# Patient Record
Sex: MALE | Race: WHITE | NOT HISPANIC OR LATINO
[De-identification: names, ages, dates, MRNs, and addresses within clinical notes are randomized per-mention and may not be internally consistent; named-entity substitution may affect disease eponyms.]

---

## 2018-04-09 ENCOUNTER — APPOINTMENT (OUTPATIENT)
Dept: ORTHOPEDIC SURGERY | Facility: CLINIC | Age: 43
End: 2018-04-09
Payer: COMMERCIAL

## 2018-04-09 VITALS — WEIGHT: 210 LBS | BODY MASS INDEX: 29.4 KG/M2 | HEIGHT: 71 IN

## 2018-04-09 DIAGNOSIS — Z87.891 PERSONAL HISTORY OF NICOTINE DEPENDENCE: ICD-10-CM

## 2018-04-09 DIAGNOSIS — J44.9 CHRONIC OBSTRUCTIVE PULMONARY DISEASE, UNSPECIFIED: ICD-10-CM

## 2018-04-09 DIAGNOSIS — M25.579 PAIN IN UNSPECIFIED ANKLE AND JOINTS OF UNSPECIFIED FOOT: ICD-10-CM

## 2018-04-09 DIAGNOSIS — Z78.9 OTHER SPECIFIED HEALTH STATUS: ICD-10-CM

## 2018-04-09 PROCEDURE — 73610 X-RAY EXAM OF ANKLE: CPT | Mod: LT

## 2018-04-09 PROCEDURE — 99203 OFFICE O/P NEW LOW 30 MIN: CPT | Mod: 25

## 2018-04-09 PROCEDURE — 29515 APPLICATION SHORT LEG SPLINT: CPT | Mod: LT

## 2018-04-12 ENCOUNTER — APPOINTMENT (OUTPATIENT)
Dept: ORTHOPEDIC SURGERY | Facility: HOSPITAL | Age: 43
End: 2018-04-12
Payer: COMMERCIAL

## 2018-04-12 DIAGNOSIS — S82.842A DISPLACED BIMALLEOLAR FRACTURE OF LEFT LOWER LEG, INITIAL ENCOUNTER FOR CLOSED FRACTURE: ICD-10-CM

## 2018-04-12 PROCEDURE — 27814 TREATMENT OF ANKLE FRACTURE: CPT | Mod: LT

## 2018-04-23 ENCOUNTER — APPOINTMENT (OUTPATIENT)
Dept: ORTHOPEDIC SURGERY | Facility: CLINIC | Age: 43
End: 2018-04-23
Payer: COMMERCIAL

## 2018-04-23 PROCEDURE — 99024 POSTOP FOLLOW-UP VISIT: CPT

## 2018-04-23 PROCEDURE — 73610 X-RAY EXAM OF ANKLE: CPT | Mod: LT

## 2018-04-23 RX ORDER — HYDROCODONE BITARTRATE AND ACETAMINOPHEN 5; 325 MG/1; MG/1
5-325 TABLET ORAL
Qty: 30 | Refills: 0 | Status: DISCONTINUED | COMMUNITY
Start: 2018-04-12 | End: 2018-04-23

## 2018-05-21 ENCOUNTER — APPOINTMENT (OUTPATIENT)
Dept: ORTHOPEDIC SURGERY | Facility: CLINIC | Age: 43
End: 2018-05-21
Payer: COMMERCIAL

## 2018-05-21 PROCEDURE — 73610 X-RAY EXAM OF ANKLE: CPT | Mod: LT

## 2018-05-21 PROCEDURE — 99024 POSTOP FOLLOW-UP VISIT: CPT

## 2018-06-08 ENCOUNTER — APPOINTMENT (OUTPATIENT)
Dept: ORTHOPEDIC SURGERY | Facility: CLINIC | Age: 43
End: 2018-06-08
Payer: COMMERCIAL

## 2018-06-08 PROCEDURE — 99024 POSTOP FOLLOW-UP VISIT: CPT

## 2018-06-08 PROCEDURE — 73600 X-RAY EXAM OF ANKLE: CPT | Mod: LT

## 2018-06-09 ENCOUNTER — TRANSCRIPTION ENCOUNTER (OUTPATIENT)
Age: 43
End: 2018-06-09

## 2018-07-02 ENCOUNTER — APPOINTMENT (OUTPATIENT)
Dept: ORTHOPEDIC SURGERY | Facility: CLINIC | Age: 43
End: 2018-07-02
Payer: COMMERCIAL

## 2018-07-02 PROCEDURE — 99024 POSTOP FOLLOW-UP VISIT: CPT

## 2018-07-02 PROCEDURE — 73600 X-RAY EXAM OF ANKLE: CPT | Mod: LT

## 2018-10-01 ENCOUNTER — APPOINTMENT (OUTPATIENT)
Dept: ORTHOPEDIC SURGERY | Facility: CLINIC | Age: 43
End: 2018-10-01
Payer: COMMERCIAL

## 2018-10-01 DIAGNOSIS — S82.842D DISPLACED BIMALLEOLAR FRACTURE OF LEFT LOWER LEG, SUBSEQUENT ENCOUNTER FOR CLOSED FRACTURE WITH ROUTINE HEALING: ICD-10-CM

## 2018-10-01 PROCEDURE — 99213 OFFICE O/P EST LOW 20 MIN: CPT

## 2018-10-01 PROCEDURE — 73600 X-RAY EXAM OF ANKLE: CPT | Mod: LT

## 2018-11-13 ENCOUNTER — RECORD ABSTRACTING (OUTPATIENT)
Age: 43
End: 2018-11-13

## 2018-11-13 DIAGNOSIS — E83.52 HYPERCALCEMIA: ICD-10-CM

## 2018-11-13 DIAGNOSIS — Z80.0 FAMILY HISTORY OF MALIGNANT NEOPLASM OF DIGESTIVE ORGANS: ICD-10-CM

## 2018-11-13 DIAGNOSIS — F10.21 ALCOHOL DEPENDENCE, IN REMISSION: ICD-10-CM

## 2018-11-13 DIAGNOSIS — Z80.42 FAMILY HISTORY OF MALIGNANT NEOPLASM OF PROSTATE: ICD-10-CM

## 2018-12-07 ENCOUNTER — APPOINTMENT (OUTPATIENT)
Dept: UROLOGY | Facility: CLINIC | Age: 43
End: 2018-12-07
Payer: COMMERCIAL

## 2018-12-07 VITALS
SYSTOLIC BLOOD PRESSURE: 127 MMHG | BODY MASS INDEX: 29.4 KG/M2 | DIASTOLIC BLOOD PRESSURE: 86 MMHG | HEIGHT: 71 IN | WEIGHT: 210 LBS | HEART RATE: 108 BPM

## 2018-12-07 DIAGNOSIS — N40.0 BENIGN PROSTATIC HYPERPLASIA WITHOUT LOWER URINARY TRACT SYMPMS: ICD-10-CM

## 2018-12-07 DIAGNOSIS — N52.1 ERECTILE DYSFUNCTION DUE TO DISEASES CLASSIFIED ELSEWHERE: ICD-10-CM

## 2018-12-07 DIAGNOSIS — R19.5 OTHER FECAL ABNORMALITIES: ICD-10-CM

## 2018-12-07 PROCEDURE — 99215 OFFICE O/P EST HI 40 MIN: CPT | Mod: 25

## 2018-12-07 PROCEDURE — 82270 OCCULT BLOOD FECES: CPT

## 2018-12-07 PROCEDURE — 81000 URINALYSIS NONAUTO W/SCOPE: CPT

## 2018-12-11 PROBLEM — N52.1 ERECTILE DYSFUNCTION DUE TO DISEASES CLASSIFIED ELSEWHERE: Status: ACTIVE | Noted: 2018-11-13

## 2018-12-11 PROBLEM — R19.5 GUAIAC POSITIVE STOOLS: Status: ACTIVE | Noted: 2018-12-11

## 2018-12-11 PROBLEM — N40.0 BENIGN PROSTATIC HYPERPLASIA WITHOUT LOWER URINARY TRACT SYMPTOMS: Status: ACTIVE | Noted: 2018-11-13

## 2018-12-11 LAB
BACTERIA: 0
BILIRUB UR QL STRIP: NORMAL
CASTS: 0
CLARITY UR: CLEAR
COLLECTION METHOD: NORMAL
CRYSTALS: 0
DATE COLLECTED: NORMAL
EPITHELIAL CELLS: 0
GLUCOSE UR-MCNC: NORMAL
HCG UR QL: 2 EU/DL
HEMOCCULT SP1 STL QL: NEGATIVE
HGB UR QL STRIP.AUTO: NORMAL
KETONES UR-MCNC: NORMAL
LEUKOCYTE ESTERASE UR QL STRIP: NORMAL
MUCUS: 0
NITRITE UR QL STRIP: NORMAL
PH UR STRIP: 5
PROT UR STRIP-MCNC: NORMAL
QUALITY CONTROL: YES
RBC CASTS # UR COMP ASSIST: 0
SP GR UR STRIP: 1.02
WBC: 0

## 2018-12-11 RX ORDER — SILDENAFIL CITRATE 25 MG/1
25 TABLET, FILM COATED ORAL
Refills: 0 | Status: DISCONTINUED | COMMUNITY
End: 2018-12-11

## 2018-12-27 ENCOUNTER — TRANSCRIPTION ENCOUNTER (OUTPATIENT)
Age: 43
End: 2018-12-27

## 2018-12-31 ENCOUNTER — APPOINTMENT (OUTPATIENT)
Dept: INTERNAL MEDICINE | Facility: CLINIC | Age: 43
End: 2018-12-31
Payer: COMMERCIAL

## 2018-12-31 VITALS
SYSTOLIC BLOOD PRESSURE: 124 MMHG | TEMPERATURE: 98.8 F | BODY MASS INDEX: 29.4 KG/M2 | DIASTOLIC BLOOD PRESSURE: 88 MMHG | WEIGHT: 210 LBS | OXYGEN SATURATION: 98 % | HEIGHT: 71 IN | HEART RATE: 112 BPM

## 2018-12-31 DIAGNOSIS — S91.309A UNSPECIFIED OPEN WOUND, UNSPECIFIED FOOT, INITIAL ENCOUNTER: ICD-10-CM

## 2018-12-31 PROCEDURE — 99213 OFFICE O/P EST LOW 20 MIN: CPT

## 2019-01-02 PROBLEM — S91.309A WOUND, OPEN, FOOT: Status: ACTIVE | Noted: 2019-01-02

## 2019-01-02 NOTE — ASSESSMENT
[FreeTextEntry1] : patient will need to be seen in wound center. Calls were place and patient will be evaluated. There is no pain or necrotic tissue as of yet. Area was cleaned and bandaged. In my opinion patient is a candidate for hyperbarics.

## 2019-01-02 NOTE — PHYSICAL EXAM
[de-identified] : there is a large second degree open wound on the surface of the right foot. There is yellow discharge. Skin layer is gone. There is granulation tissue forming on the side.

## 2019-01-02 NOTE — HISTORY OF PRESENT ILLNESS
[FreeTextEntry8] : Patient last week dropped boiling water on his foot. He went to urgent care twice. He was given a shot of antibiotic a elliotanus shot and sent home with oral antibiotics. Patient wound has still not healed.

## 2019-10-28 ENCOUNTER — APPOINTMENT (OUTPATIENT)
Dept: INTERNAL MEDICINE | Facility: CLINIC | Age: 44
End: 2019-10-28
Payer: COMMERCIAL

## 2019-10-28 ENCOUNTER — NON-APPOINTMENT (OUTPATIENT)
Age: 44
End: 2019-10-28

## 2019-10-28 VITALS
OXYGEN SATURATION: 97 % | BODY MASS INDEX: 33.04 KG/M2 | HEART RATE: 98 BPM | WEIGHT: 236 LBS | DIASTOLIC BLOOD PRESSURE: 82 MMHG | TEMPERATURE: 98.6 F | SYSTOLIC BLOOD PRESSURE: 130 MMHG | HEIGHT: 71 IN

## 2019-10-28 DIAGNOSIS — Z00.00 ENCOUNTER FOR GENERAL ADULT MEDICAL EXAMINATION W/OUT ABNORMAL FINDINGS: ICD-10-CM

## 2019-10-28 PROCEDURE — G0442 ANNUAL ALCOHOL SCREEN 15 MIN: CPT

## 2019-10-28 PROCEDURE — 36415 COLL VENOUS BLD VENIPUNCTURE: CPT

## 2019-10-28 PROCEDURE — 99396 PREV VISIT EST AGE 40-64: CPT | Mod: 25

## 2019-10-28 PROCEDURE — 93000 ELECTROCARDIOGRAM COMPLETE: CPT

## 2019-10-28 PROCEDURE — G0444 DEPRESSION SCREEN ANNUAL: CPT

## 2019-10-28 RX ORDER — FLUTICASONE PROPIONATE 50 UG/1
50 SPRAY, METERED NASAL
Refills: 0 | Status: DISCONTINUED | COMMUNITY
End: 2019-10-28

## 2019-10-28 RX ORDER — SILDENAFIL 20 MG/1
20 TABLET ORAL
Refills: 0 | Status: DISCONTINUED | COMMUNITY
End: 2019-10-28

## 2019-10-28 NOTE — PHYSICAL EXAM
[No Acute Distress] : no acute distress [PERRL] : pupils equal round and reactive to light [Normal Outer Ear/Nose] : the outer ears and nose were normal in appearance [Normal TMs] : both tympanic membranes were normal [No Lymphadenopathy] : no lymphadenopathy [Supple] : supple [No Respiratory Distress] : no respiratory distress  [No Accessory Muscle Use] : no accessory muscle use [Clear to Auscultation] : lungs were clear to auscultation bilaterally [Normal Rate] : normal rate  [Regular Rhythm] : with a regular rhythm [Normal S1, S2] : normal S1 and S2 [No Murmur] : no murmur heard [No Carotid Bruits] : no carotid bruits [Pedal Pulses Present] : the pedal pulses are present [No Edema] : there was no peripheral edema [Soft] : abdomen soft [Non Tender] : non-tender [Normal Bowel Sounds] : normal bowel sounds [Declined Rectal Exam] : declined rectal exam [No CVA Tenderness] : no CVA  tenderness [No Spinal Tenderness] : no spinal tenderness [Normal Supraclavicular Nodes] : no supraclavicular lymphadenopathy [Normal Posterior Cervical Nodes] : no posterior cervical lymphadenopathy [Normal Anterior Cervical Nodes] : no anterior cervical lymphadenopathy [No Joint Swelling] : no joint swelling [Grossly Normal Strength/Tone] : grossly normal strength/tone [Normal] : affect was normal and insight and judgment were intact [de-identified] : wears glasses [de-identified] : obese; umbilical hernia [de-identified] : healed surgical scar left ankle [de-identified] : 4 cm lesion right shoulder

## 2019-10-28 NOTE — REVIEW OF SYSTEMS
[Poor Libido] : poor libido [Fever] : no fever [Chills] : no chills [Fatigue] : no fatigue [Pain] : no pain [Redness] : no redness [Nosebleeds] : no nosebleeds [Postnasal Drip] : no postnasal drip [Hoarseness] : no hoarseness [Chest Pain] : no chest pain [Palpitations] : no palpitations [Lower Ext Edema] : no lower extremity edema [Shortness Of Breath] : no shortness of breath [Cough] : no cough [Abdominal Pain] : no abdominal pain [Nausea] : no nausea [Constipation] : no constipation [Diarrhea] : no diarrhea [Melena] : no melena [Dysuria] : no dysuria [Incontinence] : no incontinence [Nocturia] : no nocturia [Impotence] : no impotency [Joint Stiffness] : no joint stiffness [Itching] : no itching [Headache] : no headache [Dizziness] : no dizziness [Suicidal] : not suicidal [Insomnia] : no insomnia [Depression] : no depression [Easy Bleeding] : no easy bleeding [Easy Bruising] : no easy bruising [Swollen Glands] : no swollen glands [FreeTextEntry3] : wears glasses for distant; last eye exam more than 1 year ago [FreeTextEntry9] : s/p left ankle surgery [de-identified] : he has an inflamed epidermoid cyst on left shoulder [FreeTextEntry1] : Endocrine: no increased thirst, polydipsia, polyphagia, polydipsia

## 2019-10-28 NOTE — HISTORY OF PRESENT ILLNESS
[FreeTextEntry1] : Annual Physical [de-identified] : 44 year old man, h/o Sleep Apnea, HTN, Erectile Dysfunction here for his Annual Exam.  He is requesting a referral to GI; his father was diagnosed with Colon Cancer in his early 60's.

## 2019-10-28 NOTE — HEALTH RISK ASSESSMENT
[Very Good] : ~his/her~ current health as very good [Good] : ~his/her~  mood as  good [No falls in past year] : Patient reported no falls in the past year [0] : 2) Feeling down, depressed, or hopeless: Not at all (0) [HIV test declined] : HIV test declined [Hepatitis C test declined] : Hepatitis C test declined [None] : None [Alone] : lives alone [Employed] : employed [College] : College [Single] : single [# Of Children ___] : has [unfilled] children [Sexually Active] : sexually active [Feels Safe at Home] : Feels safe at home [Fully functional (bathing, dressing, toileting, transferring, walking, feeding)] : Fully functional (bathing, dressing, toileting, transferring, walking, feeding) [Fully functional (using the telephone, shopping, preparing meals, housekeeping, doing laundry, using] : Fully functional and needs no help or supervision to perform IADLs (using the telephone, shopping, preparing meals, housekeeping, doing laundry, using transportation, managing medications and managing finances) [Smoke Detector] : smoke detector [Carbon Monoxide Detector] : carbon monoxide detector [6-10] : 6-10 [Yes] : Yes [2 - 3 times a week (3 pts)] : 2 - 3  times a week (3 points) [3 or 4 (1 pt)] : 3 or 4  (1 point) [Never (0 pts)] : Never (0 points) [No] : In the past 12 months have you used drugs other than those required for medical reasons? No [Seat Belt] :  uses seat belt [With Patient/Caregiver] : With Patient/Caregiver [] : No [YearQuit] : 2015 [Audit-CScore] : 4 [de-identified] : no [de-identified] : yes [[id number]] : 0 [Change in mental status noted] : No change in mental status noted [Reports changes in hearing] : Reports no changes in hearing [Reports changes in vision] : Reports no changes in vision [Reports normal functional visual acuity (ie: able to read med bottle)] : Reports poor functional visual acuity.  [Reports changes in dental health] : Reports no changes in dental health [Guns at Home] : no guns at home [ColonoscopyComments] : never perform [FreeTextEntry2] :  [AdvancecareDate] : 10/19

## 2019-11-14 LAB
ALBUMIN SERPL ELPH-MCNC: 4.5 G/DL
ALP BLD-CCNC: 82 U/L
ALT SERPL-CCNC: 57 U/L
ANION GAP SERPL CALC-SCNC: 12 MMOL/L
APPEARANCE: CLEAR
AST SERPL-CCNC: 31 U/L
BACTERIA: NEGATIVE
BASOPHILS # BLD AUTO: 0.08 K/UL
BASOPHILS NFR BLD AUTO: 1.3 %
BILIRUB SERPL-MCNC: 0.2 MG/DL
BILIRUBIN URINE: NEGATIVE
BLOOD URINE: NEGATIVE
BUN SERPL-MCNC: 12 MG/DL
CALCIUM SERPL-MCNC: 9.4 MG/DL
CHLORIDE SERPL-SCNC: 105 MMOL/L
CHOLEST SERPL-MCNC: 187 MG/DL
CHOLEST/HDLC SERPL: 4.5 RATIO
CO2 SERPL-SCNC: 23 MMOL/L
COLOR: YELLOW
CREAT SERPL-MCNC: 0.73 MG/DL
EOSINOPHIL # BLD AUTO: 0.25 K/UL
EOSINOPHIL NFR BLD AUTO: 4.1 %
ESTIMATED AVERAGE GLUCOSE: 114 MG/DL
GLUCOSE QUALITATIVE U: NEGATIVE
GLUCOSE SERPL-MCNC: 127 MG/DL
HBA1C MFR BLD HPLC: 5.6 %
HCT VFR BLD CALC: 46.5 %
HDLC SERPL-MCNC: 42 MG/DL
HGB BLD-MCNC: 14.8 G/DL
HYALINE CASTS: 0 /LPF
IMM GRANULOCYTES NFR BLD AUTO: 0.2 %
KETONES URINE: NEGATIVE
LDLC SERPL CALC-MCNC: 126 MG/DL
LEUKOCYTE ESTERASE URINE: NEGATIVE
LYMPHOCYTES # BLD AUTO: 1.56 K/UL
LYMPHOCYTES NFR BLD AUTO: 25.9 %
MAN DIFF?: NORMAL
MCHC RBC-ENTMCNC: 31.8 GM/DL
MCHC RBC-ENTMCNC: 33.9 PG
MCV RBC AUTO: 106.4 FL
MICROSCOPIC-UA: NORMAL
MONOCYTES # BLD AUTO: 0.52 K/UL
MONOCYTES NFR BLD AUTO: 8.6 %
NEUTROPHILS # BLD AUTO: 3.61 K/UL
NEUTROPHILS NFR BLD AUTO: 59.9 %
NITRITE URINE: NEGATIVE
PH URINE: 5.5
PLATELET # BLD AUTO: 205 K/UL
POTASSIUM SERPL-SCNC: 4.9 MMOL/L
PROT SERPL-MCNC: 6.8 G/DL
PROTEIN URINE: NEGATIVE
PSA FREE FLD-MCNC: 37 %
PSA FREE SERPL-MCNC: 0.09 NG/ML
PSA SERPL-MCNC: 0.24 NG/ML
RBC # BLD: 4.37 M/UL
RBC # FLD: 12.2 %
RED BLOOD CELLS URINE: 1 /HPF
SODIUM SERPL-SCNC: 140 MMOL/L
SPECIFIC GRAVITY URINE: 1.02
SQUAMOUS EPITHELIAL CELLS: 0 /HPF
TRIGL SERPL-MCNC: 97 MG/DL
UROBILINOGEN URINE: NORMAL
WBC # FLD AUTO: 6.03 K/UL
WHITE BLOOD CELLS URINE: 1 /HPF

## 2020-03-12 ENCOUNTER — APPOINTMENT (OUTPATIENT)
Dept: GASTROENTEROLOGY | Facility: CLINIC | Age: 45
End: 2020-03-12
Payer: COMMERCIAL

## 2020-03-12 VITALS
OXYGEN SATURATION: 97 % | DIASTOLIC BLOOD PRESSURE: 80 MMHG | HEART RATE: 103 BPM | SYSTOLIC BLOOD PRESSURE: 132 MMHG | BODY MASS INDEX: 33.04 KG/M2 | WEIGHT: 236 LBS | HEIGHT: 71 IN

## 2020-03-12 DIAGNOSIS — Z12.11 ENCOUNTER FOR SCREENING FOR MALIGNANT NEOPLASM OF COLON: ICD-10-CM

## 2020-03-12 PROCEDURE — 99204 OFFICE O/P NEW MOD 45 MIN: CPT

## 2020-03-12 NOTE — ASSESSMENT
[FreeTextEntry1] : Will plan on a colonoscopy for screening.  Explained risks/benefits/alternatives including not limited to bleeding, infection, perforation, missed lesions, anesthesia complications.  Patient understands and agrees, all questions answered.  Will use a split dose miralax/gatorade prep with clears the day prior.\par \par Thank you for referring Mr. HENRIQUEZ.  Please do not hesitate to call to further discuss his/her care.\par \par Note faxed to requesting MD.\par \par

## 2020-03-12 NOTE — HISTORY OF PRESENT ILLNESS
[FreeTextEntry1] : Mr. Benítez is a pleasant 44M h/o obesity, HTN, ANDRES who is seen at the request of Dr. Flores and Dr. Lopez for evaluation for colon cancer screening.  His father had colon cancer in his early 60's, no other family relatives with colon cancer or other GI malignancies.\par \par He has never had any form of colon cancer screening.  Has a normal brown BM daily, although he has complained of narrow stool.  No blood, black stool, diarrhea, constipation.\par \par Has had mild heartburn brought on by specific food triggers which he avoids, otherwise no heartburn or regurgitation.\par \par Previous smoker, quit 2015.  Has multiple alcoholic drinks weekly.

## 2021-02-17 ENCOUNTER — APPOINTMENT (OUTPATIENT)
Dept: PULMONOLOGY | Facility: CLINIC | Age: 46
End: 2021-02-17
Payer: MEDICAID

## 2021-02-17 VITALS
OXYGEN SATURATION: 95 % | SYSTOLIC BLOOD PRESSURE: 146 MMHG | DIASTOLIC BLOOD PRESSURE: 80 MMHG | WEIGHT: 242 LBS | BODY MASS INDEX: 33.88 KG/M2 | HEIGHT: 71 IN | TEMPERATURE: 98.1 F | HEART RATE: 98 BPM

## 2021-02-17 PROCEDURE — 99213 OFFICE O/P EST LOW 20 MIN: CPT

## 2021-02-17 PROCEDURE — 99072 ADDL SUPL MATRL&STAF TM PHE: CPT

## 2021-02-18 NOTE — HISTORY OF PRESENT ILLNESS
[FreeTextEntry1] : 44 y/o man is here for a follow-up for his ANDRES. He has been compliant with his APAP machine on a nightly basis. He reports some nerve tingling in his L arm which he thinks might be from leaning on it most of the day. He is sleeping less because he is back on a schedule that has him waking up earlier. He does occasionally fall asleep watching TV downstairs then goes up to bed later. He is currently using a medium Dreamwear nasal mask. His machine is from [employer]. He was given a Dreamwear FullFace Medium mask and headgear in the office. He has gained some weight since his last visit in 2017. He denies lower extremity edema, SOB, dry mouth, cough, or congestion. He does not use nasal saline.

## 2021-02-18 NOTE — PROCEDURE
[FreeTextEntry1] : APAP Compliance, Date: 01/18/2021 - 02/16/2021\par Usage: 30 days (100.0% Compliance)\par Percent of Days with Usage >= 4 hours: 90% \par Average Use: 5 hours 21 minutes\par Pressure Setting 4-15 cm H2O \par Auto-CPAP Peak Avg Pressure: 11.7 cmH2O\par Avg Device Pressure <= 90% of the time: 12.0 cmH2O\par Average Time in Large Leak per day: 1 min 20 secs\par Avg AHI: 2.9\par \par PSG, Date: 03/06/2018, AHI: [30.2], [REM AHI: [0]], lowest saturation [85]%], PLM Index: [9.5]\par \par HST [10/24/2017], AHI: [66.3], lowest saturation [63]%

## 2021-03-09 ENCOUNTER — TRANSCRIPTION ENCOUNTER (OUTPATIENT)
Age: 46
End: 2021-03-09

## 2021-03-10 ENCOUNTER — APPOINTMENT (OUTPATIENT)
Dept: INTERNAL MEDICINE | Facility: CLINIC | Age: 46
End: 2021-03-10
Payer: MEDICAID

## 2021-03-10 ENCOUNTER — LABORATORY RESULT (OUTPATIENT)
Age: 46
End: 2021-03-10

## 2021-03-10 VITALS
OXYGEN SATURATION: 97 % | HEART RATE: 86 BPM | HEIGHT: 71 IN | BODY MASS INDEX: 34.16 KG/M2 | DIASTOLIC BLOOD PRESSURE: 98 MMHG | SYSTOLIC BLOOD PRESSURE: 144 MMHG | TEMPERATURE: 96.7 F | RESPIRATION RATE: 16 BRPM | WEIGHT: 244 LBS

## 2021-03-10 DIAGNOSIS — U07.1 COVID-19: ICD-10-CM

## 2021-03-10 PROCEDURE — G0442 ANNUAL ALCOHOL SCREEN 15 MIN: CPT

## 2021-03-10 PROCEDURE — 99396 PREV VISIT EST AGE 40-64: CPT | Mod: 25

## 2021-03-10 PROCEDURE — 99072 ADDL SUPL MATRL&STAF TM PHE: CPT

## 2021-03-10 PROCEDURE — G0447 BEHAVIOR COUNSEL OBESITY 15M: CPT

## 2021-03-10 PROCEDURE — G0444 DEPRESSION SCREEN ANNUAL: CPT

## 2021-03-10 RX ORDER — TADALAFIL 20 MG/1
20 TABLET ORAL
Qty: 50 | Refills: 0 | Status: ACTIVE | COMMUNITY
Start: 2021-03-10 | End: 1900-01-01

## 2021-03-11 NOTE — HISTORY OF PRESENT ILLNESS
Last OV 9/27/2018. Next OV 2/26/2019. [FreeTextEntry1] : annual exam  [de-identified] : Patient is here for an annual exam , patient currently feels fine, denies chest pain or abdominal pain. Reports being non compliant with diet due to stress. Patient has high BP but will return in two week for recheck of blood pressure.

## 2021-03-11 NOTE — HEALTH RISK ASSESSMENT
[Very Good] : ~his/her~  mood as very good [Yes] : Yes [Monthly or less (1 pt)] : Monthly or less (1 point) [3 or 4 (1 pt)] : 3 or 4  (1 point) [Less than monthly (1 pt)] : Less than monthly (1 point) [No] : In the past 12 months have you used drugs other than those required for medical reasons? No [No falls in past year] : Patient reported no falls in the past year [1] : 2) Feeling down, depressed, or hopeless for several days (1) [Alone] : lives alone [With Family] : lives with family [Employed] : employed [Single] : single [Fully functional (bathing, dressing, toileting, transferring, walking, feeding)] : Fully functional (bathing, dressing, toileting, transferring, walking, feeding) [Fully functional (using the telephone, shopping, preparing meals, housekeeping, doing laundry, using] : Fully functional and needs no help or supervision to perform IADLs (using the telephone, shopping, preparing meals, housekeeping, doing laundry, using transportation, managing medications and managing finances) [Smoke Detector] : smoke detector [Carbon Monoxide Detector] : carbon monoxide detector [] : No [Audit-CScore] : 3 [[id number]] : 2 [Change in mental status noted] : No change in mental status noted [Reports changes in hearing] : Reports no changes in hearing [Reports changes in vision] : Reports no changes in vision [Reports changes in dental health] : Reports no changes in dental health [Guns at Home] : no guns at home

## 2021-03-16 LAB
ALBUMIN SERPL ELPH-MCNC: 4.7 G/DL
ALP BLD-CCNC: 81 U/L
ALT SERPL-CCNC: 56 U/L
ANION GAP SERPL CALC-SCNC: 11 MMOL/L
AST SERPL-CCNC: 42 U/L
BASOPHILS # BLD AUTO: 0.12 K/UL
BASOPHILS NFR BLD AUTO: 1.7 %
BILIRUB SERPL-MCNC: 0.3 MG/DL
BUN SERPL-MCNC: 14 MG/DL
CALCIUM SERPL-MCNC: 9.4 MG/DL
CHLORIDE SERPL-SCNC: 102 MMOL/L
CHOLEST SERPL-MCNC: 212 MG/DL
CO2 SERPL-SCNC: 24 MMOL/L
CREAT SERPL-MCNC: 0.74 MG/DL
EOSINOPHIL # BLD AUTO: 0.31 K/UL
EOSINOPHIL NFR BLD AUTO: 4.3 %
ESTIMATED AVERAGE GLUCOSE: 128 MG/DL
GLUCOSE SERPL-MCNC: 125 MG/DL
HBA1C MFR BLD HPLC: 6.1 %
HCT VFR BLD CALC: 50.1 %
HDLC SERPL-MCNC: 47 MG/DL
HGB BLD-MCNC: 16.1 G/DL
LDLC SERPL CALC-MCNC: 136 MG/DL
LYMPHOCYTES # BLD AUTO: 1.61 K/UL
LYMPHOCYTES NFR BLD AUTO: 22.2 %
MAN DIFF?: NORMAL
MCHC RBC-ENTMCNC: 32.1 GM/DL
MCHC RBC-ENTMCNC: 33.3 PG
MCV RBC AUTO: 103.5 FL
MONOCYTES # BLD AUTO: 0.5 K/UL
MONOCYTES NFR BLD AUTO: 6.9 %
NEUTROPHILS # BLD AUTO: 4.33 K/UL
NEUTROPHILS NFR BLD AUTO: 58.1 %
NONHDLC SERPL-MCNC: 164 MG/DL
PLATELET # BLD AUTO: 209 K/UL
POTASSIUM SERPL-SCNC: 4.9 MMOL/L
PROT SERPL-MCNC: 7.3 G/DL
PSA SERPL-MCNC: 0.29 NG/ML
RBC # BLD: 4.84 M/UL
RBC # FLD: 12.7 %
SARS-COV-2 IGG SERPL IA-ACNC: 0.06 INDEX
SARS-COV-2 IGG SERPL QL IA: NEGATIVE
SODIUM SERPL-SCNC: 138 MMOL/L
T4 SERPL-MCNC: 6.3 UG/DL
TRIGL SERPL-MCNC: 140 MG/DL
TSH SERPL-ACNC: 0.96 UIU/ML
VIT B12 SERPL-MCNC: 628 PG/ML
WBC # FLD AUTO: 7.24 K/UL

## 2021-03-25 ENCOUNTER — APPOINTMENT (OUTPATIENT)
Dept: INTERNAL MEDICINE | Facility: CLINIC | Age: 46
End: 2021-03-25
Payer: MEDICAID

## 2021-03-25 VITALS
WEIGHT: 244 LBS | BODY MASS INDEX: 34.16 KG/M2 | DIASTOLIC BLOOD PRESSURE: 90 MMHG | HEART RATE: 110 BPM | HEIGHT: 71 IN | SYSTOLIC BLOOD PRESSURE: 150 MMHG | OXYGEN SATURATION: 98 %

## 2021-03-25 DIAGNOSIS — I10 ESSENTIAL (PRIMARY) HYPERTENSION: ICD-10-CM

## 2021-03-25 PROCEDURE — 86580 TB INTRADERMAL TEST: CPT

## 2021-03-25 PROCEDURE — 99072 ADDL SUPL MATRL&STAF TM PHE: CPT

## 2021-03-25 PROCEDURE — 99213 OFFICE O/P EST LOW 20 MIN: CPT | Mod: 25

## 2021-03-29 PROBLEM — I10 ESSENTIAL (PRIMARY) HYPERTENSION: Status: ACTIVE | Noted: 2018-11-13

## 2021-05-19 ENCOUNTER — APPOINTMENT (OUTPATIENT)
Dept: PULMONOLOGY | Facility: CLINIC | Age: 46
End: 2021-05-19

## 2021-06-11 ENCOUNTER — APPOINTMENT (OUTPATIENT)
Dept: PULMONOLOGY | Facility: CLINIC | Age: 46
End: 2021-06-11
Payer: MEDICAID

## 2021-06-11 VITALS
DIASTOLIC BLOOD PRESSURE: 82 MMHG | BODY MASS INDEX: 35.28 KG/M2 | WEIGHT: 252 LBS | TEMPERATURE: 98.1 F | OXYGEN SATURATION: 97 % | SYSTOLIC BLOOD PRESSURE: 134 MMHG | HEIGHT: 71 IN | HEART RATE: 96 BPM

## 2021-06-11 LAB — EPWORTH SCORE: 4

## 2021-06-11 PROCEDURE — 99214 OFFICE O/P EST MOD 30 MIN: CPT

## 2021-06-13 NOTE — PROCEDURE
[FreeTextEntry1] : APAP Compliance, Date: 05/12/2021 - 06/10/2021\par Usage: 29 days (96.7% Compliance)\par Percent of Days with Usage >= 4 hours: 93.3% \par Average Use: 7 hours 11 minutes\par Pressure Setting 6-15 cm H2O \par Auto-CPAP Peak Avg Pressure: 13.2 cmH2O\par Avg Device Pressure <= 90% of the time: 13.4 cmH2O\par Average Time in Large Leak per day: 1 min 46 secs\par Avg AHI: 1.6\par \par APAP Compliance, Date: 01/18/2021 - 02/16/2021\par Usage: 30 days (100.0% Compliance)\par Percent of Days with Usage >= 4 hours: 90% \par Average Use: 5 hours 21 minutes\par Pressure Setting 4-15 cm H2O \par Auto-CPAP Peak Avg Pressure: 11.7 cmH2O\par Avg Device Pressure <= 90% of the time: 12.0 cmH2O\par Average Time in Large Leak per day: 1 min 20 secs\par Avg AHI: 2.9\par \par PSG, Date: 03/06/2018, AHI: [30.2], [REM AHI: [0]], lowest saturation [85]%], PLM Index: [9.5]\par \par HST [10/24/2017], AHI: [66.3], lowest saturation [63]%

## 2021-06-13 NOTE — PHYSICAL EXAM
[No Acute Distress] : no acute distress [Normal Appearance] : normal appearance [No Neck Mass] : no neck mass [Normal Rate/Rhythm] : normal rate/rhythm [Normal S1, S2] : normal s1, s2 [No Murmurs] : no murmurs [No Resp Distress] : no resp distress [Clear to Auscultation Bilaterally] : clear to auscultation bilaterally [No Abnormalities] : no abnormalities [Benign] : benign [Normal Gait] : normal gait [No Clubbing] : no clubbing [No Cyanosis] : no cyanosis [FROM] : FROM [Normal Color/ Pigmentation] : normal color/ pigmentation [No Focal Deficits] : no focal deficits [Oriented x3] : oriented x3 [Normal Affect] : normal affect [IV] : Mallampati Class: IV [TextBox_2] : obese [TextBox_11] : nasal turbinate hypertrophy, dry mucosa, crowded oropharynx [TextBox_89] : obese [TextBox_105] : trace edema

## 2021-06-13 NOTE — HISTORY OF PRESENT ILLNESS
[Former] : former [Never] : never [TextBox_4] : Patient is here on a f/u for ANDRES. Patient has been compliant with the APAP. He feels slightly more awake and feels better during the day. However he says sometimes he is waking up in the middle of the night and is wide awake. He says he has gotten a lot better on remembering to use the APAP before going to sleep. Last night, he felt himself getting tired and went straight to bed but could not fall asleep. He c/o sometimes waking up with a dry mouth. He denies leg swelling. He reports his weight has been up by 6lbs but his BP is down.

## 2021-09-08 ENCOUNTER — APPOINTMENT (OUTPATIENT)
Dept: INTERNAL MEDICINE | Facility: CLINIC | Age: 46
End: 2021-09-08

## 2021-09-09 ENCOUNTER — APPOINTMENT (OUTPATIENT)
Dept: INTERNAL MEDICINE | Facility: CLINIC | Age: 46
End: 2021-09-09
Payer: MEDICAID

## 2021-09-09 VITALS
HEART RATE: 76 BPM | DIASTOLIC BLOOD PRESSURE: 100 MMHG | BODY MASS INDEX: 35.84 KG/M2 | HEIGHT: 71 IN | SYSTOLIC BLOOD PRESSURE: 150 MMHG | OXYGEN SATURATION: 98 % | WEIGHT: 256 LBS

## 2021-09-09 PROCEDURE — 99214 OFFICE O/P EST MOD 30 MIN: CPT | Mod: 25

## 2021-09-17 ENCOUNTER — APPOINTMENT (OUTPATIENT)
Dept: PULMONOLOGY | Facility: CLINIC | Age: 46
End: 2021-09-17
Payer: MEDICAID

## 2021-09-17 VITALS
WEIGHT: 257 LBS | BODY MASS INDEX: 35.98 KG/M2 | DIASTOLIC BLOOD PRESSURE: 78 MMHG | OXYGEN SATURATION: 99 % | HEIGHT: 71 IN | HEART RATE: 114 BPM | SYSTOLIC BLOOD PRESSURE: 126 MMHG | TEMPERATURE: 98.6 F

## 2021-09-17 LAB
ALBUMIN SERPL ELPH-MCNC: 4.8 G/DL
ALP BLD-CCNC: 83 U/L
ALT SERPL-CCNC: 80 U/L
ANION GAP SERPL CALC-SCNC: 13 MMOL/L
AST SERPL-CCNC: 44 U/L
BILIRUB SERPL-MCNC: 0.4 MG/DL
BUN SERPL-MCNC: 16 MG/DL
CALCIUM SERPL-MCNC: 9.5 MG/DL
CHLORIDE SERPL-SCNC: 102 MMOL/L
CHOLEST SERPL-MCNC: 212 MG/DL
CO2 SERPL-SCNC: 23 MMOL/L
CREAT SERPL-MCNC: 0.77 MG/DL
EPWORTH SCORE: 3
ESTIMATED AVERAGE GLUCOSE: 131 MG/DL
GLUCOSE SERPL-MCNC: 150 MG/DL
HAV IGM SER QL: NONREACTIVE
HBA1C MFR BLD HPLC: 6.2 %
HBV CORE IGM SER QL: NONREACTIVE
HBV SURFACE AG SER QL: NONREACTIVE
HCV AB SER QL: NONREACTIVE
HCV S/CO RATIO: 0.14 S/CO
HDLC SERPL-MCNC: 45 MG/DL
LDLC SERPL CALC-MCNC: 129 MG/DL
NONHDLC SERPL-MCNC: 167 MG/DL
POTASSIUM SERPL-SCNC: 4.9 MMOL/L
PROT SERPL-MCNC: 7.3 G/DL
SODIUM SERPL-SCNC: 138 MMOL/L
TRIGL SERPL-MCNC: 189 MG/DL

## 2021-09-17 PROCEDURE — 99214 OFFICE O/P EST MOD 30 MIN: CPT

## 2021-09-23 NOTE — HISTORY OF PRESENT ILLNESS
[Former] : former [Never] : never [TextBox_4] : Patient returns on a f/u for ANDRES. He has been fully compliant with CPAP although his machine has been subject to the recall. He feels better in general but is still having significant trouble getting a full night of sleep. He will get sleep for the first couple of hours but is then "wide awake" for 2-3 hours in the middle of the night. When he does fall asleep again he can usually sleep for another 2-5 hours until he has to wake up. He states he is not overly tired during the day and feels the urge to nap maybe 2 days a week. On the days he can sleep in later he feels better during the day. His breathing has been fine. He c/o rare chest tightness which he attributes more to work stress than anything else. Dr. Flores put him on Lexapro recently but he is getting "almost every side effect from it"(dry mouth, lip smacking, slight headache all week, jitters). He also believes the Lexapro is part of the reason he is waking up at night, as he has had multiple short nights this week following starting on it. He notes he woke up again last night but interestingly got better than usual sleep when he fell back to sleep eventually. He has been using nasal saline on a regular basis, almost every night when he remembers. He is 257 lbs today and has started trying to eat healthier.

## 2021-09-23 NOTE — PHYSICAL EXAM
[No Acute Distress] : no acute distress [IV] : Mallampati Class: IV [Normal Appearance] : normal appearance [No Neck Mass] : no neck mass [Normal Rate/Rhythm] : normal rate/rhythm [Normal S1, S2] : normal s1, s2 [No Murmurs] : no murmurs [No Resp Distress] : no resp distress [Clear to Auscultation Bilaterally] : clear to auscultation bilaterally [No Abnormalities] : no abnormalities [Benign] : benign [Normal Gait] : normal gait [No Clubbing] : no clubbing [No Cyanosis] : no cyanosis [FROM] : FROM [Normal Color/ Pigmentation] : normal color/ pigmentation [Oriented x3] : oriented x3 [Normal Affect] : normal affect [Erythema] : erythema [Turbinate hypertrophy] : turbinate hypertrophy [No Edema] : no edema [TextBox_2] : obese [TextBox_11] : frequent lip smacking, dry MM, crowded o/p  [TextBox_89] : obese [TextBox_132] : fine tremor R>L UE

## 2021-11-17 ENCOUNTER — APPOINTMENT (OUTPATIENT)
Dept: INTERNAL MEDICINE | Facility: CLINIC | Age: 46
End: 2021-11-17
Payer: MEDICAID

## 2021-11-17 VITALS
SYSTOLIC BLOOD PRESSURE: 128 MMHG | RESPIRATION RATE: 16 BRPM | WEIGHT: 258 LBS | BODY MASS INDEX: 36.12 KG/M2 | HEART RATE: 87 BPM | DIASTOLIC BLOOD PRESSURE: 86 MMHG | HEIGHT: 71 IN | OXYGEN SATURATION: 97 %

## 2021-11-17 DIAGNOSIS — F41.9 ANXIETY DISORDER, UNSPECIFIED: ICD-10-CM

## 2021-11-17 DIAGNOSIS — F32.A ANXIETY DISORDER, UNSPECIFIED: ICD-10-CM

## 2021-11-17 PROCEDURE — 99214 OFFICE O/P EST MOD 30 MIN: CPT

## 2021-11-18 PROBLEM — F41.9 ANXIETY AND DEPRESSION: Status: ACTIVE | Noted: 2021-09-09

## 2021-11-30 DIAGNOSIS — F90.9 ATTENTION-DEFICIT HYPERACTIVITY DISORDER, UNSPECIFIED TYPE: ICD-10-CM

## 2021-12-07 ENCOUNTER — NON-APPOINTMENT (OUTPATIENT)
Age: 46
End: 2021-12-07

## 2021-12-13 ENCOUNTER — APPOINTMENT (OUTPATIENT)
Dept: INTERNAL MEDICINE | Facility: CLINIC | Age: 46
End: 2021-12-13

## 2022-01-03 ENCOUNTER — APPOINTMENT (OUTPATIENT)
Dept: PULMONOLOGY | Facility: CLINIC | Age: 47
End: 2022-01-03
Payer: MEDICAID

## 2022-01-03 VITALS
DIASTOLIC BLOOD PRESSURE: 76 MMHG | BODY MASS INDEX: 36.96 KG/M2 | TEMPERATURE: 98.81 F | HEIGHT: 71 IN | OXYGEN SATURATION: 97 % | WEIGHT: 264 LBS | SYSTOLIC BLOOD PRESSURE: 134 MMHG | HEART RATE: 95 BPM

## 2022-01-03 PROCEDURE — 99214 OFFICE O/P EST MOD 30 MIN: CPT

## 2022-01-05 NOTE — PROCEDURE
[FreeTextEntry1] : APAP Compliance, Date: 12/04/2021 - 01/02/2022\par Usage: 30 days (100.0% Compliance)\par Percent of Days with Usage >= 4 hours: 96.7% \par Average Use: 7 hours 48 minutes\par Pressure Setting 8-20 cm H2O \par Auto-CPAP Peak Avg Pressure: 13.1 cmH2O\par Avg Device Pressure <= 90% of the time: 13.7 cmH2O\par Average Time in Large Leak per day: 1 min 32 secs\par Avg AHI: 1.2\par \par APAP Compliance, Date: 08/12/2021 - 09/10/2021\par Usage: 28 days (93.3% Compliance)\par Percent of Days with Usage >= 4 hours: 90% \par Average Use: 6 hours 39 minutes 27 seconds\par Pressure Setting 6-15 cm H2O \par Auto-CPAP Peak Avg Pressure: 13.5 cmH2O\par Avg Device Pressure <= 90% of the time: 13.7 cmH2O\par Average Time in Large Leak per day: 1 min 19 secs\par Avg AHI: 1.9\par \par APAP Compliance, Date: 05/12/2021 - 06/10/2021\par Usage: 29 days (96.7% Compliance)\par Percent of Days with Usage >= 4 hours: 93.3% \par Average Use: 7 hours 11 minutes\par Pressure Setting 6-15 cm H2O \par Auto-CPAP Peak Avg Pressure: 13.2 cmH2O\par Avg Device Pressure <= 90% of the time: 13.4 cmH2O\par Average Time in Large Leak per day: 1 min 46 secs\par Avg AHI: 1.6\par \par APAP Compliance, Date: 01/18/2021 - 02/16/2021\par Usage: 30 days (100.0% Compliance)\par Percent of Days with Usage >= 4 hours: 90% \par Average Use: 5 hours 21 minutes\par Pressure Setting 4-15 cm H2O \par Auto-CPAP Peak Avg Pressure: 11.7 cmH2O\par Avg Device Pressure <= 90% of the time: 12.0 cmH2O\par Average Time in Large Leak per day: 1 min 20 secs\par Avg AHI: 2.9\par \par PSG, Date: 03/06/2018, AHI: [30.2], [REM AHI: [0]], lowest saturation [85]%], PLM Index: [9.5]\par \par HST [10/24/2017], AHI: [66.3], lowest saturation [63]%

## 2022-01-05 NOTE — PHYSICAL EXAM
[No Acute Distress] : no acute distress [IV] : Mallampati Class: IV [Normal Appearance] : normal appearance [No Neck Mass] : no neck mass [Normal Rate/Rhythm] : normal rate/rhythm [Normal S1, S2] : normal s1, s2 [No Murmurs] : no murmurs [No Resp Distress] : no resp distress [Clear to Auscultation Bilaterally] : clear to auscultation bilaterally [No Abnormalities] : no abnormalities [Benign] : benign [Normal Gait] : normal gait [No Clubbing] : no clubbing [No Cyanosis] : no cyanosis [No Edema] : no edema [FROM] : FROM [Normal Color/ Pigmentation] : normal color/ pigmentation [Oriented x3] : oriented x3 [Normal Affect] : normal affect [Nasal congestion] : nasal congestion [TextBox_2] : obese [TextBox_11] : dry nasal mucosa [TextBox_89] : obese [TextBox_132] : fine tremor R>L UE

## 2022-01-05 NOTE — HISTORY OF PRESENT ILLNESS
[Former] : former [Never] : never [TextBox_4] : Patient returns on a f/u for ANDRES. He has been fully compliant w/ CPAP but is still waiting on a replacement machine. He is still not sleeping great and specifically is having trouble staying asleep. He can get around 2-4 hours of sleep until he finds himself lying in bed "wide awake." Dr. Flores switched him off Lexapro and put him on 5 mg Adderall instead, which seems to be effective in helping his mood w/o the side effects the Lexapro was causing. He has only been using nasal saline at night. He has gained 7 lbs since his last visit in mid September.

## 2022-01-11 ENCOUNTER — TRANSCRIPTION ENCOUNTER (OUTPATIENT)
Age: 47
End: 2022-01-11

## 2022-01-21 ENCOUNTER — RESULT REVIEW (OUTPATIENT)
Age: 47
End: 2022-01-21

## 2022-03-07 ENCOUNTER — LABORATORY RESULT (OUTPATIENT)
Age: 47
End: 2022-03-07

## 2022-03-07 ENCOUNTER — APPOINTMENT (OUTPATIENT)
Dept: PULMONOLOGY | Facility: CLINIC | Age: 47
End: 2022-03-07
Payer: MEDICAID

## 2022-03-07 VITALS
TEMPERATURE: 97.3 F | HEIGHT: 71 IN | BODY MASS INDEX: 36.12 KG/M2 | WEIGHT: 258 LBS | OXYGEN SATURATION: 97 % | DIASTOLIC BLOOD PRESSURE: 60 MMHG | HEART RATE: 95 BPM | SYSTOLIC BLOOD PRESSURE: 148 MMHG

## 2022-03-07 PROCEDURE — 99214 OFFICE O/P EST MOD 30 MIN: CPT

## 2022-03-07 NOTE — PROCEDURE
[FreeTextEntry1] : APAP Compliance, Date: 02/05/2022 - 03/06/2022\par Usage: 30 days (100.0% Compliance)\par Percent of Days with Usage >= 4 hours: 100% \par Average Use: 8 hours 18 minutes\par Pressure Setting 10-20 cm H2O \par Auto-CPAP Peak Avg Pressure: 15.8 cmH2O\par Avg Device Pressure <= 90% of the time: 14.8 cmH2O\par Average Time in Large Leak per day: 1 min 50 secs\par Avg AHI: 0.8\par \par APAP Compliance, Date: 12/04/2021 - 01/02/2022\par Usage: 30 days (100.0% Compliance)\par Percent of Days with Usage >= 4 hours: 96.7% \par Average Use: 7 hours 48 minutes\par Pressure Setting 8-20 cm H2O \par Auto-CPAP Peak Avg Pressure: 13.1 cmH2O\par Avg Device Pressure <= 90% of the time: 13.7 cmH2O\par Average Time in Large Leak per day: 1 min 32 secs\par Avg AHI: 1.2\par \par APAP Compliance, Date: 08/12/2021 - 09/10/2021\par Usage: 28 days (93.3% Compliance)\par Percent of Days with Usage >= 4 hours: 90% \par Average Use: 6 hours 39 minutes 27 seconds\par Pressure Setting 6-15 cm H2O \par Auto-CPAP Peak Avg Pressure: 13.5 cmH2O\par Avg Device Pressure <= 90% of the time: 13.7 cmH2O\par Average Time in Large Leak per day: 1 min 19 secs\par Avg AHI: 1.9\par \par APAP Compliance, Date: 05/12/2021 - 06/10/2021\par Usage: 29 days (96.7% Compliance)\par Percent of Days with Usage >= 4 hours: 93.3% \par Average Use: 7 hours 11 minutes\par Pressure Setting 6-15 cm H2O \par Auto-CPAP Peak Avg Pressure: 13.2 cmH2O\par Avg Device Pressure <= 90% of the time: 13.4 cmH2O\par Average Time in Large Leak per day: 1 min 46 secs\par Avg AHI: 1.6\par \par APAP Compliance, Date: 01/18/2021 - 02/16/2021\par Usage: 30 days (100.0% Compliance)\par Percent of Days with Usage >= 4 hours: 90% \par Average Use: 5 hours 21 minutes\par Pressure Setting 4-15 cm H2O \par Auto-CPAP Peak Avg Pressure: 11.7 cmH2O\par Avg Device Pressure <= 90% of the time: 12.0 cmH2O\par Average Time in Large Leak per day: 1 min 20 secs\par Avg AHI: 2.9\par \par PSG, Date: 03/06/2018, AHI: [30.2], [REM AHI: [0]], lowest saturation [85]%], PLM Index: [9.5]\par \par HST [10/24/2017], AHI: [66.3], lowest saturation [63]%

## 2022-03-07 NOTE — HISTORY OF PRESENT ILLNESS
[Former] : former [Never] : never [TextBox_4] : Patient returns on a f/u for ANDRES. It's been almost 2 months now since he's had COVID (tested + 1/11) but he still feels residual tiredness. He has been fully compliant w/ his CPAP but is still waiting for his new [employer]station. He is sleeping a lot more but still wakes up in the middle of the night. He notes he never regularly slept through the night his entire life, and although it improved the last 4-5 years w/ CPAP he still wakes up. + C/o dry throat while sitting in the exam room today and has been coughing consistently. He rarely gets any nasal issues unless he is tired/ yawning a lot. He uses nasal saline at least 2 sprays 3-4x/ day. Patient denies seasonal allergies, CP, pressure, leg pain/ swelling or muscle aches. He had gained weight previously but is down 6 lbs since January (264 -> 258).

## 2022-03-07 NOTE — REASON FOR VISIT
[Follow-Up] : a follow-up visit [Sleep Apnea] : sleep apnea [COPD] : COPD [TextBox_44] : COVID IN JAN 22

## 2022-03-07 NOTE — PHYSICAL EXAM
[No Acute Distress] : no acute distress [IV] : Mallampati Class: IV [Normal Appearance] : normal appearance [No Neck Mass] : no neck mass [Normal Rate/Rhythm] : normal rate/rhythm [Normal S1, S2] : normal s1, s2 [No Murmurs] : no murmurs [No Resp Distress] : no resp distress [Clear to Auscultation Bilaterally] : clear to auscultation bilaterally [No Abnormalities] : no abnormalities [Benign] : benign [Normal Gait] : normal gait [No Clubbing] : no clubbing [No Cyanosis] : no cyanosis [No Edema] : no edema [FROM] : FROM [Normal Color/ Pigmentation] : normal color/ pigmentation [Oriented x3] : oriented x3 [Normal Affect] : normal affect [Erythema] : erythema [Turbinate hypertrophy] : turbinate hypertrophy [No Focal Deficits] : no focal deficits [TextBox_2] : obese [TextBox_11] : erythematous o/p [TextBox_54] : distant [TextBox_89] : obese

## 2022-03-08 LAB
ALBUMIN SERPL ELPH-MCNC: 4.5 G/DL
ALP BLD-CCNC: 87 U/L
ALT SERPL-CCNC: 71 U/L
ANION GAP SERPL CALC-SCNC: 12 MMOL/L
AST SERPL-CCNC: 41 U/L
BASOPHILS # BLD AUTO: 0.03 K/UL
BASOPHILS NFR BLD AUTO: 0.4 %
BILIRUB SERPL-MCNC: 0.2 MG/DL
BUN SERPL-MCNC: 11 MG/DL
CALCIUM SERPL-MCNC: 9.5 MG/DL
CHLORIDE SERPL-SCNC: 102 MMOL/L
CO2 SERPL-SCNC: 24 MMOL/L
CREAT SERPL-MCNC: 0.69 MG/DL
CRP SERPL-MCNC: 14 MG/L
EGFR: 114 ML/MIN/1.73M2
EOSINOPHIL # BLD AUTO: 0.41 K/UL
EOSINOPHIL NFR BLD AUTO: 5.6 %
ERYTHROCYTE [SEDIMENTATION RATE] IN BLOOD BY WESTERGREN METHOD: 21 MM/HR
FERRITIN SERPL-MCNC: 340 NG/ML
GLUCOSE SERPL-MCNC: 198 MG/DL
HCT VFR BLD CALC: 48.9 %
HGB BLD-MCNC: 15.2 G/DL
IMM GRANULOCYTES NFR BLD AUTO: 0.3 %
IRON SATN MFR SERPL: 28 %
IRON SERPL-MCNC: 97 UG/DL
LYMPHOCYTES # BLD AUTO: 1.58 K/UL
LYMPHOCYTES NFR BLD AUTO: 21.7 %
MAGNESIUM SERPL-MCNC: 2.1 MG/DL
MAN DIFF?: NORMAL
MCHC RBC-ENTMCNC: 31.1 GM/DL
MCHC RBC-ENTMCNC: 32.6 PG
MCV RBC AUTO: 104.9 FL
MONOCYTES # BLD AUTO: 0.45 K/UL
MONOCYTES NFR BLD AUTO: 6.2 %
NEUTROPHILS # BLD AUTO: 4.79 K/UL
NEUTROPHILS NFR BLD AUTO: 65.8 %
PLATELET # BLD AUTO: 162 K/UL
POTASSIUM SERPL-SCNC: 5 MMOL/L
PROT SERPL-MCNC: 6.8 G/DL
RBC # BLD: 4.66 M/UL
RBC # FLD: 12.8 %
SODIUM SERPL-SCNC: 138 MMOL/L
TIBC SERPL-MCNC: 351 UG/DL
UIBC SERPL-MCNC: 253 UG/DL
WBC # FLD AUTO: 7.28 K/UL

## 2022-03-13 LAB
A ALTERNATA IGE QN: <0.1 KUA/L
A FUMIGATUS IGE QN: <0.1 KUA/L
BERMUDA GRASS IGE QN: <0.1 KUA/L
BOXELDER IGE QN: <0.1 KUA/L
C HERBARUM IGE QN: 0.13 KUA/L
CALIF WALNUT IGE QN: <0.1 KUA/L
CAT DANDER IGE QN: <0.1 KUA/L
CMN PIGWEED IGE QN: <0.1 KUA/L
COMMON RAGWEED IGE QN: <0.1 KUA/L
COTTONWOOD IGE QN: <0.1 KUA/L
D FARINAE IGE QN: <0.1 KUA/L
D PTERONYSS IGE QN: <0.1 KUA/L
DEPRECATED A ALTERNATA IGE RAST QL: 0
DEPRECATED A FUMIGATUS IGE RAST QL: 0
DEPRECATED BERMUDA GRASS IGE RAST QL: 0
DEPRECATED BOXELDER IGE RAST QL: 0
DEPRECATED C HERBARUM IGE RAST QL: NORMAL
DEPRECATED CAT DANDER IGE RAST QL: 0
DEPRECATED COMMON PIGWEED IGE RAST QL: 0
DEPRECATED COMMON RAGWEED IGE RAST QL: 0
DEPRECATED COTTONWOOD IGE RAST QL: 0
DEPRECATED D FARINAE IGE RAST QL: 0
DEPRECATED D PTERONYSS IGE RAST QL: 0
DEPRECATED DOG DANDER IGE RAST QL: 0
DEPRECATED GOOSEFOOT IGE RAST QL: 0
DEPRECATED LONDON PLANE IGE RAST QL: 0
DEPRECATED MOUSE URINE PROT IGE RAST QL: 0
DEPRECATED MUGWORT IGE RAST QL: 0
DEPRECATED P NOTATUM IGE RAST QL: 0
DEPRECATED RED CEDAR IGE RAST QL: 0
DEPRECATED ROACH IGE RAST QL: 0
DEPRECATED SHEEP SORREL IGE RAST QL: 0
DEPRECATED SILVER BIRCH IGE RAST QL: 0
DEPRECATED TIMOTHY IGE RAST QL: 0
DEPRECATED WHITE ASH IGE RAST QL: 0
DEPRECATED WHITE OAK IGE RAST QL: 0
DOG DANDER IGE QN: <0.1 KUA/L
GOOSEFOOT IGE QN: <0.1 KUA/L
LONDON PLANE IGE QN: <0.1 KUA/L
MOUSE URINE PROT IGE QN: <0.1 KUA/L
MUGWORT IGE QN: <0.1 KUA/L
MULBERRY (T70) CLASS: 0
MULBERRY (T70) CONC: <0.1 KUA/L
P NOTATUM IGE QN: <0.1 KUA/L
RED CEDAR IGE QN: <0.1 KUA/L
ROACH IGE QN: <0.1 KUA/L
SHEEP SORREL IGE QN: <0.1 KUA/L
SILVER BIRCH IGE QN: <0.1 KUA/L
TIMOTHY IGE QN: <0.1 KUA/L
TREE ALLERG MIX1 IGE QL: 0
WHITE ASH IGE QN: <0.1 KUA/L
WHITE ELM IGE QN: 0
WHITE ELM IGE QN: <0.1 KUA/L
WHITE OAK IGE QN: <0.1 KUA/L

## 2022-03-18 ENCOUNTER — RESULT REVIEW (OUTPATIENT)
Age: 47
End: 2022-03-18

## 2022-03-20 ENCOUNTER — RESULT REVIEW (OUTPATIENT)
Age: 47
End: 2022-03-20

## 2022-03-21 ENCOUNTER — APPOINTMENT (OUTPATIENT)
Dept: GASTROENTEROLOGY | Facility: HOSPITAL | Age: 47
End: 2022-03-21

## 2022-04-12 ENCOUNTER — TRANSCRIPTION ENCOUNTER (OUTPATIENT)
Age: 47
End: 2022-04-12

## 2022-04-15 ENCOUNTER — APPOINTMENT (OUTPATIENT)
Dept: INTERNAL MEDICINE | Facility: CLINIC | Age: 47
End: 2022-04-15
Payer: MEDICAID

## 2022-04-15 VITALS
SYSTOLIC BLOOD PRESSURE: 140 MMHG | WEIGHT: 258 LBS | BODY MASS INDEX: 36.12 KG/M2 | OXYGEN SATURATION: 99 % | RESPIRATION RATE: 16 BRPM | HEIGHT: 71 IN | DIASTOLIC BLOOD PRESSURE: 100 MMHG | HEART RATE: 90 BPM

## 2022-04-15 DIAGNOSIS — R73.03 PREDIABETES.: ICD-10-CM

## 2022-04-15 DIAGNOSIS — I10 ESSENTIAL (PRIMARY) HYPERTENSION: ICD-10-CM

## 2022-04-15 PROCEDURE — 99214 OFFICE O/P EST MOD 30 MIN: CPT | Mod: 25

## 2022-04-27 DIAGNOSIS — E11.9 TYPE 2 DIABETES MELLITUS W/OUT COMPLICATIONS: ICD-10-CM

## 2022-04-27 DIAGNOSIS — Z79.4 DRUG OR CHEMICAL INDUCED DIABETES MELLITUS WITH NEUROLOGICAL COMPLICATIONS WITH DIABETIC AUTONOMIC (POLY)NEUROPATHY: ICD-10-CM

## 2022-04-27 DIAGNOSIS — Z79.4 TYPE 2 DIABETES MELLITUS WITH OTHER DIABETIC NEUROLOGICAL COMPLICATION: ICD-10-CM

## 2022-04-27 DIAGNOSIS — E09.43 DRUG OR CHEMICAL INDUCED DIABETES MELLITUS WITH NEUROLOGICAL COMPLICATIONS WITH DIABETIC AUTONOMIC (POLY)NEUROPATHY: ICD-10-CM

## 2022-04-27 DIAGNOSIS — E11.49 TYPE 2 DIABETES MELLITUS WITH OTHER DIABETIC NEUROLOGICAL COMPLICATION: ICD-10-CM

## 2022-04-27 LAB
ALBUMIN SERPL ELPH-MCNC: 4.5 G/DL
ALP BLD-CCNC: 80 U/L
ALT SERPL-CCNC: 126 U/L
ANION GAP SERPL CALC-SCNC: 13 MMOL/L
AST SERPL-CCNC: 81 U/L
BILIRUB SERPL-MCNC: 0.3 MG/DL
BUN SERPL-MCNC: 11 MG/DL
CALCIUM SERPL-MCNC: 9.1 MG/DL
CHLORIDE SERPL-SCNC: 100 MMOL/L
CHOLEST SERPL-MCNC: 190 MG/DL
CO2 SERPL-SCNC: 22 MMOL/L
CREAT SERPL-MCNC: 0.69 MG/DL
EGFR: 114 ML/MIN/1.73M2
ESTIMATED AVERAGE GLUCOSE: 146 MG/DL
GLUCOSE SERPL-MCNC: 163 MG/DL
HBA1C MFR BLD HPLC: 6.7 %
HDLC SERPL-MCNC: 35 MG/DL
LDLC SERPL CALC-MCNC: 133 MG/DL
NONHDLC SERPL-MCNC: 155 MG/DL
POTASSIUM SERPL-SCNC: 4.5 MMOL/L
PROT SERPL-MCNC: 7.1 G/DL
SODIUM SERPL-SCNC: 135 MMOL/L
TRIGL SERPL-MCNC: 109 MG/DL

## 2022-04-27 RX ORDER — METFORMIN HYDROCHLORIDE 500 MG/1
500 TABLET, COATED ORAL
Qty: 180 | Refills: 3 | Status: ACTIVE | COMMUNITY
Start: 2022-04-27 | End: 1900-01-01

## 2022-05-02 ENCOUNTER — APPOINTMENT (OUTPATIENT)
Dept: PULMONOLOGY | Facility: CLINIC | Age: 47
End: 2022-05-02
Payer: MEDICAID

## 2022-05-02 VITALS
HEIGHT: 71 IN | WEIGHT: 258 LBS | SYSTOLIC BLOOD PRESSURE: 124 MMHG | HEART RATE: 106 BPM | OXYGEN SATURATION: 95 % | DIASTOLIC BLOOD PRESSURE: 72 MMHG | TEMPERATURE: 97 F | BODY MASS INDEX: 36.12 KG/M2

## 2022-05-02 PROCEDURE — 99214 OFFICE O/P EST MOD 30 MIN: CPT

## 2022-05-03 NOTE — PROCEDURE
[FreeTextEntry1] : APAP Compliance, Date: 04/01/2022 - 04/30/2022\par Usage: 30 days (100.0% Compliance)\par Percent of Days with Usage >= 4 hours: 100% \par Average Use: 7 hours 35 minutes\par Pressure Setting 10-20 cm H2O \par Auto-CPAP Peak Avg Pressure: 13.8 cmH2O\par Avg Device Pressure <= 90% of the time: 12.9 cmH2O\par Average Time in Large Leak per day: 1 min 48 secs\par Avg AHI: 0.9\par \par APAP Compliance, Date: 02/05/2022 - 03/06/2022\par Usage: 30 days (100.0% Compliance)\par Percent of Days with Usage >= 4 hours: 100% \par Average Use: 8 hours 18 minutes\par Pressure Setting 10-20 cm H2O \par Auto-CPAP Peak Avg Pressure: 15.8 cmH2O\par Avg Device Pressure <= 90% of the time: 14.8 cmH2O\par Average Time in Large Leak per day: 1 min 50 secs\par Avg AHI: 0.8\par \par APAP Compliance, Date: 12/04/2021 - 01/02/2022\par Usage: 30 days (100.0% Compliance)\par Percent of Days with Usage >= 4 hours: 96.7% \par Average Use: 7 hours 48 minutes\par Pressure Setting 8-20 cm H2O \par Auto-CPAP Peak Avg Pressure: 13.1 cmH2O\par Avg Device Pressure <= 90% of the time: 13.7 cmH2O\par Average Time in Large Leak per day: 1 min 32 secs\par Avg AHI: 1.2\par \par APAP Compliance, Date: 08/12/2021 - 09/10/2021\par Usage: 28 days (93.3% Compliance)\par Percent of Days with Usage >= 4 hours: 90% \par Average Use: 6 hours 39 minutes 27 seconds\par Pressure Setting 6-15 cm H2O \par Auto-CPAP Peak Avg Pressure: 13.5 cmH2O\par Avg Device Pressure <= 90% of the time: 13.7 cmH2O\par Average Time in Large Leak per day: 1 min 19 secs\par Avg AHI: 1.9\par \par APAP Compliance, Date: 05/12/2021 - 06/10/2021\par Usage: 29 days (96.7% Compliance)\par Percent of Days with Usage >= 4 hours: 93.3% \par Average Use: 7 hours 11 minutes\par Pressure Setting 6-15 cm H2O \par Auto-CPAP Peak Avg Pressure: 13.2 cmH2O\par Avg Device Pressure <= 90% of the time: 13.4 cmH2O\par Average Time in Large Leak per day: 1 min 46 secs\par Avg AHI: 1.6\par \par APAP Compliance, Date: 01/18/2021 - 02/16/2021\par Usage: 30 days (100.0% Compliance)\par Percent of Days with Usage >= 4 hours: 90% \par Average Use: 5 hours 21 minutes\par Pressure Setting 4-15 cm H2O \par Auto-CPAP Peak Avg Pressure: 11.7 cmH2O\par Avg Device Pressure <= 90% of the time: 12.0 cmH2O\par Average Time in Large Leak per day: 1 min 20 secs\par Avg AHI: 2.9\par \par PSG, Date: 03/06/2018, AHI: [30.2], [REM AHI: [0]], lowest saturation [85]%], PLM Index: [9.5]\par \par HST [10/24/2017], AHI: [66.3], lowest saturation [63]%

## 2022-05-03 NOTE — PHYSICAL EXAM
[No Acute Distress] : no acute distress [Turbinate hypertrophy] : turbinate hypertrophy [IV] : Mallampati Class: IV [Normal Appearance] : normal appearance [No Neck Mass] : no neck mass [Normal Rate/Rhythm] : normal rate/rhythm [Normal S1, S2] : normal s1, s2 [No Murmurs] : no murmurs [No Resp Distress] : no resp distress [Clear to Auscultation Bilaterally] : clear to auscultation bilaterally [No Abnormalities] : no abnormalities [Benign] : benign [Normal Gait] : normal gait [No Clubbing] : no clubbing [No Cyanosis] : no cyanosis [No Edema] : no edema [FROM] : FROM [Normal Color/ Pigmentation] : normal color/ pigmentation [No Focal Deficits] : no focal deficits [Oriented x3] : oriented x3 [Normal Affect] : normal affect [TextBox_2] : obese [TextBox_11] : erythematous o/p [TextBox_89] : obese

## 2022-05-03 NOTE — HISTORY OF PRESENT ILLNESS
[Former] : former [Never] : never [TextBox_4] : Patient returns on a f/u for ANDRES. Patient has been fully compliant w/ CPAP and states he is sleeping better. He still occasionally wakes up in the middle of the night but for the last 2 months have been much better for him. His sore/ dry throat has improved since the weather has been warmer of late and there is less heat/ dry air in his house. His weight has remained the same but he expects the weight loss to come w/ the dietary changes he has been applying over the last week which he discussed w/ Dr. Flores. He has also been walking at least 2 mi/day around his neighborhood for the past few weeks. He walks the same pace the entire 2 miles and it takes him roughly 45 min to finish.

## 2022-06-14 RX ORDER — DEXTROAMPHETAMINE SACCHARATE, AMPHETAMINE ASPARTATE, DEXTROAMPHETAMINE SULFATE AND AMPHETAMINE SULFATE 1.25; 1.25; 1.25; 1.25 MG/1; MG/1; MG/1; MG/1
5 TABLET ORAL TWICE DAILY
Qty: 60 | Refills: 0 | Status: ACTIVE | COMMUNITY
Start: 2021-11-30 | End: 1900-01-01

## 2022-08-23 ENCOUNTER — NON-APPOINTMENT (OUTPATIENT)
Age: 47
End: 2022-08-23

## 2022-08-23 ENCOUNTER — APPOINTMENT (OUTPATIENT)
Dept: INTERNAL MEDICINE | Facility: CLINIC | Age: 47
End: 2022-08-23

## 2022-08-23 VITALS
HEART RATE: 76 BPM | DIASTOLIC BLOOD PRESSURE: 80 MMHG | BODY MASS INDEX: 35.28 KG/M2 | SYSTOLIC BLOOD PRESSURE: 140 MMHG | HEIGHT: 71 IN | WEIGHT: 252 LBS | OXYGEN SATURATION: 98 %

## 2022-08-23 DIAGNOSIS — R79.89 OTHER SPECIFIED ABNORMAL FINDINGS OF BLOOD CHEMISTRY: ICD-10-CM

## 2022-08-23 PROCEDURE — 99396 PREV VISIT EST AGE 40-64: CPT | Mod: 25

## 2022-08-23 PROCEDURE — 36415 COLL VENOUS BLD VENIPUNCTURE: CPT

## 2022-08-23 PROCEDURE — 93000 ELECTROCARDIOGRAM COMPLETE: CPT

## 2022-08-23 PROCEDURE — G0447 BEHAVIOR COUNSEL OBESITY 15M: CPT

## 2022-08-24 ENCOUNTER — NON-APPOINTMENT (OUTPATIENT)
Age: 47
End: 2022-08-24

## 2022-09-01 LAB
25(OH)D3 SERPL-MCNC: 29.9 NG/ML
ALBUMIN SERPL ELPH-MCNC: 4.5 G/DL
ALP BLD-CCNC: 88 U/L
ALT SERPL-CCNC: 50 U/L
ANION GAP SERPL CALC-SCNC: 14 MMOL/L
AST SERPL-CCNC: 34 U/L
BASOPHILS # BLD AUTO: 0.07 K/UL
BASOPHILS NFR BLD AUTO: 1.1 %
BILIRUB SERPL-MCNC: 0.4 MG/DL
BUN SERPL-MCNC: 16 MG/DL
CALCIUM SERPL-MCNC: 9.4 MG/DL
CHLORIDE SERPL-SCNC: 101 MMOL/L
CHOLEST SERPL-MCNC: 201 MG/DL
CO2 SERPL-SCNC: 21 MMOL/L
CREAT SERPL-MCNC: 0.68 MG/DL
EGFR: 114 ML/MIN/1.73M2
EOSINOPHIL # BLD AUTO: 0.28 K/UL
EOSINOPHIL NFR BLD AUTO: 4.4 %
ESTIMATED AVERAGE GLUCOSE: 131 MG/DL
GLUCOSE SERPL-MCNC: 150 MG/DL
HBA1C MFR BLD HPLC: 6.2 %
HCT VFR BLD CALC: 46.6 %
HDLC SERPL-MCNC: 48 MG/DL
HGB BLD-MCNC: 15.3 G/DL
IMM GRANULOCYTES NFR BLD AUTO: 0.3 %
LDLC SERPL CALC-MCNC: 126 MG/DL
LYMPHOCYTES # BLD AUTO: 1.48 K/UL
LYMPHOCYTES NFR BLD AUTO: 23 %
MAN DIFF?: NORMAL
MCHC RBC-ENTMCNC: 32.8 GM/DL
MCHC RBC-ENTMCNC: 34.2 PG
MCV RBC AUTO: 104.3 FL
MONOCYTES # BLD AUTO: 0.59 K/UL
MONOCYTES NFR BLD AUTO: 9.2 %
NEUTROPHILS # BLD AUTO: 3.99 K/UL
NEUTROPHILS NFR BLD AUTO: 62 %
NONHDLC SERPL-MCNC: 153 MG/DL
PLATELET # BLD AUTO: 184 K/UL
POTASSIUM SERPL-SCNC: 4.5 MMOL/L
PROT SERPL-MCNC: 6.9 G/DL
PSA SERPL-MCNC: 0.3 NG/ML
RBC # BLD: 4.47 M/UL
RBC # FLD: 12.6 %
SODIUM SERPL-SCNC: 135 MMOL/L
T4 SERPL-MCNC: 6.4 UG/DL
TRIGL SERPL-MCNC: 138 MG/DL
TSH SERPL-ACNC: 1.13 UIU/ML
VIT B12 SERPL-MCNC: 458 PG/ML
WBC # FLD AUTO: 6.43 K/UL

## 2022-09-12 ENCOUNTER — APPOINTMENT (OUTPATIENT)
Dept: PULMONOLOGY | Facility: CLINIC | Age: 47
End: 2022-09-12

## 2022-09-12 VITALS
DIASTOLIC BLOOD PRESSURE: 72 MMHG | HEART RATE: 99 BPM | HEIGHT: 71 IN | TEMPERATURE: 98 F | WEIGHT: 254 LBS | BODY MASS INDEX: 35.56 KG/M2 | SYSTOLIC BLOOD PRESSURE: 118 MMHG | OXYGEN SATURATION: 97 %

## 2022-09-12 DIAGNOSIS — E66.9 OBESITY, UNSPECIFIED: ICD-10-CM

## 2022-09-12 DIAGNOSIS — F51.04 PSYCHOPHYSIOLOGIC INSOMNIA: ICD-10-CM

## 2022-09-12 DIAGNOSIS — U09.9 POST COVID-19 CONDITION, UNSPECIFIED: ICD-10-CM

## 2022-09-12 DIAGNOSIS — K11.7 DISTURBANCES OF SALIVARY SECRETION: ICD-10-CM

## 2022-09-12 DIAGNOSIS — J34.89 OTHER SPECIFIED DISORDERS OF NOSE AND NASAL SINUSES: ICD-10-CM

## 2022-09-12 DIAGNOSIS — R06.5 DISTURBANCES OF SALIVARY SECRETION: ICD-10-CM

## 2022-09-12 PROCEDURE — 99214 OFFICE O/P EST MOD 30 MIN: CPT

## 2022-09-14 PROBLEM — E66.9 OBESITY, CLASS I, BMI 30.0-34.9 (SEE ACTUAL BMI): Status: ACTIVE | Noted: 2021-02-17

## 2022-09-14 PROBLEM — F51.04 PSYCHOPHYSIOLOGICAL INSOMNIA: Status: ACTIVE | Noted: 2021-06-11

## 2022-09-14 PROBLEM — J34.89 NASAL OBSTRUCTION: Status: ACTIVE | Noted: 2021-02-17

## 2022-09-14 PROBLEM — U09.9 POST-COVID-19 SYNDROME: Status: ACTIVE | Noted: 2022-03-07

## 2022-09-14 NOTE — PHYSICAL EXAM
[No Acute Distress] : no acute distress [IV] : Mallampati Class: IV [Normal Appearance] : normal appearance [No Neck Mass] : no neck mass [Normal Rate/Rhythm] : normal rate/rhythm [Normal S1, S2] : normal s1, s2 [No Murmurs] : no murmurs [No Resp Distress] : no resp distress [Clear to Auscultation Bilaterally] : clear to auscultation bilaterally [No Abnormalities] : no abnormalities [Benign] : benign [Normal Gait] : normal gait [No Clubbing] : no clubbing [No Cyanosis] : no cyanosis [No Edema] : no edema [FROM] : FROM [Normal Color/ Pigmentation] : normal color/ pigmentation [No Focal Deficits] : no focal deficits [Oriented x3] : oriented x3 [Normal Affect] : normal affect [TextBox_2] : obese [TextBox_11] : + mild turbinate hypertrophy, mild to moderate erythema [TextBox_89] : obese

## 2022-09-14 NOTE — PROCEDURE
[FreeTextEntry1] : APAP Compliance, Date: 08/13/2022 - 09/11/2022\par Usage: 30 days (100.0% Compliance)\par Percent of Days with Usage >= 4 hours: 100% \par Average Use: 6 hours 47 minutes 35 seconds\par Pressure Setting 10-20 cm H2O \par Auto-CPAP Peak Avg Pressure: 11.5 cmH2O\par Avg Device Pressure <= 90% of the time: 12.0 cmH2O\par Average Time in Large Leak per day: 1 min 38 secs\par Avg AHI: 1.7\par \par APAP Compliance, Date: 04/01/2022 - 04/30/2022\par Usage: 30 days (100.0% Compliance)\par Percent of Days with Usage >= 4 hours: 100% \par Average Use: 7 hours 35 minutes\par Pressure Setting 10-20 cm H2O \par Auto-CPAP Peak Avg Pressure: 13.8 cmH2O\par Avg Device Pressure <= 90% of the time: 12.9 cmH2O\par Average Time in Large Leak per day: 1 min 48 secs\par Avg AHI: 0.9\par \par APAP Compliance, Date: 02/05/2022 - 03/06/2022\par Usage: 30 days (100.0% Compliance)\par Percent of Days with Usage >= 4 hours: 100% \par Average Use: 8 hours 18 minutes\par Pressure Setting 10-20 cm H2O \par Auto-CPAP Peak Avg Pressure: 15.8 cmH2O\par Avg Device Pressure <= 90% of the time: 14.8 cmH2O\par Average Time in Large Leak per day: 1 min 50 secs\par Avg AHI: 0.8\par \par APAP Compliance, Date: 12/04/2021 - 01/02/2022\par Usage: 30 days (100.0% Compliance)\par Percent of Days with Usage >= 4 hours: 96.7% \par Average Use: 7 hours 48 minutes\par Pressure Setting 8-20 cm H2O \par Auto-CPAP Peak Avg Pressure: 13.1 cmH2O\par Avg Device Pressure <= 90% of the time: 13.7 cmH2O\par Average Time in Large Leak per day: 1 min 32 secs\par Avg AHI: 1.2\par \par APAP Compliance, Date: 08/12/2021 - 09/10/2021\par Usage: 28 days (93.3% Compliance)\par Percent of Days with Usage >= 4 hours: 90% \par Average Use: 6 hours 39 minutes 27 seconds\par Pressure Setting 6-15 cm H2O \par Auto-CPAP Peak Avg Pressure: 13.5 cmH2O\par Avg Device Pressure <= 90% of the time: 13.7 cmH2O\par Average Time in Large Leak per day: 1 min 19 secs\par Avg AHI: 1.9\par \par APAP Compliance, Date: 05/12/2021 - 06/10/2021\par Usage: 29 days (96.7% Compliance)\par Percent of Days with Usage >= 4 hours: 93.3% \par Average Use: 7 hours 11 minutes\par Pressure Setting 6-15 cm H2O \par Auto-CPAP Peak Avg Pressure: 13.2 cmH2O\par Avg Device Pressure <= 90% of the time: 13.4 cmH2O\par Average Time in Large Leak per day: 1 min 46 secs\par Avg AHI: 1.6\par \par APAP Compliance, Date: 01/18/2021 - 02/16/2021\par Usage: 30 days (100.0% Compliance)\par Percent of Days with Usage >= 4 hours: 90% \par Average Use: 5 hours 21 minutes\par Pressure Setting 4-15 cm H2O \par Auto-CPAP Peak Avg Pressure: 11.7 cmH2O\par Avg Device Pressure <= 90% of the time: 12.0 cmH2O\par Average Time in Large Leak per day: 1 min 20 secs\par Avg AHI: 2.9\par \par PSG, Date: 03/06/2018, AHI: [30.2], [REM AHI: [0]], lowest saturation [85]%], PLM Index: [9.5]\par \par HST [10/24/2017], AHI: [66.3], lowest saturation [63]%

## 2022-09-14 NOTE — REVIEW OF SYSTEMS
A (Catheter 5fr Mpa2 Crv 125cm Lg Inner Lum 2 Sh) catheter was used to selectively engage and inject the IVC by hand injection.  [Recent Wt Loss (___ Lbs)] : ~T recent [unfilled] lb weight loss [Nasal Congestion] : nasal congestion [Seasonal Allergies] : seasonal allergies [Diabetes] : diabetes [Obesity] : obesity [Negative] : Endocrine [TextBox_14] : in the am

## 2022-09-14 NOTE — HISTORY OF PRESENT ILLNESS
[Former] : former [Never] : never [TextBox_4] : Patient returns on a f/u for ANDRES. He has been fully compliant w/ CPAP and is sleeping a lot better as a result. He only takes about 10- 15 minutes to fall asleep initially and his nocturnal awakenings have decreased. His fatigue has also decreased significantly since he started using CPAP on a regular basis. He also attributes his improved sleep to decreased financial stress. His breathing and nasal passages have improved and he is still using saline regularly. He denies dry mouth. He lost 6 lbs since 5/2 (258 -> 252) but gained back 2 of those lbs after eating a lot yesterday.

## 2022-09-20 ENCOUNTER — APPOINTMENT (OUTPATIENT)
Dept: SURGERY | Facility: CLINIC | Age: 47
End: 2022-09-20

## 2022-09-20 ENCOUNTER — NON-APPOINTMENT (OUTPATIENT)
Age: 47
End: 2022-09-20

## 2022-09-20 VITALS
SYSTOLIC BLOOD PRESSURE: 152 MMHG | TEMPERATURE: 98.7 F | HEART RATE: 94 BPM | BODY MASS INDEX: 35.81 KG/M2 | WEIGHT: 255.8 LBS | HEIGHT: 71 IN | OXYGEN SATURATION: 98 % | DIASTOLIC BLOOD PRESSURE: 89 MMHG

## 2022-09-20 PROCEDURE — 99204 OFFICE O/P NEW MOD 45 MIN: CPT

## 2022-09-20 RX ORDER — LOSARTAN POTASSIUM 50 MG/1
50 TABLET, FILM COATED ORAL DAILY
Qty: 90 | Refills: 3 | Status: COMPLETED | COMMUNITY
Start: 2021-03-25 | End: 2022-09-20

## 2022-09-20 RX ORDER — BUPROPION HYDROCHLORIDE 150 MG/1
150 TABLET, EXTENDED RELEASE ORAL
Qty: 30 | Refills: 2 | Status: COMPLETED | COMMUNITY
Start: 2021-10-14 | End: 2022-09-20

## 2022-09-20 RX ORDER — TRAZODONE HYDROCHLORIDE 50 MG/1
50 TABLET ORAL
Refills: 0 | Status: ACTIVE | COMMUNITY

## 2022-09-20 RX ORDER — ESCITALOPRAM OXALATE 10 MG/1
10 TABLET ORAL DAILY
Qty: 90 | Refills: 3 | Status: COMPLETED | COMMUNITY
Start: 2021-09-09 | End: 2022-09-20

## 2022-09-20 NOTE — REVIEW OF SYSTEMS
[Fever] : no fever [Chills] : no chills [Feeling Tired] : not feeling tired [Recent Weight Gain (___ Lbs)] : no recent weight gain [Recent Weight Loss (___ Lbs)] : no recent weight loss [Negative] : Heme/Lymph [FreeTextEntry3] : near sighted

## 2022-09-20 NOTE — ASSESSMENT
[FreeTextEntry1] : Referring physician: Dr. Cherri Flores\par \par Date: 9/20/2022\par \par Reason for referral incarcerated ventral hernia\par \par This is a 47-year-old gentleman with a known ventral hernia for at least 1 year.  The hernia is causing him no discomfort in that time course however in the past 2 months the hernia is increased in size significantly.  He now has concerns that the hernia is significantly worsened and now wish to have this repaired.  He denies having pain in the area.  He has had no nausea no vomiting.  He has no change in bowel habits.\par \par Physical examination: Patient examined erect and supine position.  The abdomen soft nontender nondistended.  It is obese.  He has an upper midline diastases of moderate size.  He has a chronically incarcerated ventral hernia noted in the midline.  The defects are not appreciated due to its chronically incarcerated nature.  Skin overlying the hernia is mildly thinned.\par \par Patient is noted to have diffuse blotchiness along his abdominal wall.  He states he had this rash that is recent it has not been there in the past.  He thinks it is related to his seatbelt.\par \par Impression/plan chronically incarcerated ventral hernia, diffuse abdominal wall rash: This is a 47-year-old gentleman who has an obvious incarcerated ventral hernia.  The hernia can easily be addressed and repaired with mesh.  I do not think a primary repair in this gentleman setting is in his best interest given the size of this hernia.  Patient myself had a long conversation regarding the various surgical approaches such as robotic laparoscopic and open.  He is best approached in an open fashion and due to the new mesh issues and having it being placed in abdominal cavity he would benefit with a retrorectus piece of mesh and therefore require component separation.  This was discussed with this patient at length.  Surgery has not been scheduled at this point due to the fact of his rash.  I think he should be evaluated by dermatologist for treatment prior to any surgical intervention.  This will lower his chance of mesh infection and wound infection.\par \par I will see this gentleman back in approximately 1 to 2 months time for reevaluation pending rash treatment.  Thank you for allowing me to participate in the care of your patient.  Should you have any questions please feel free to give me a call directly.\par \par Sincerely yours,

## 2022-09-27 ENCOUNTER — NON-APPOINTMENT (OUTPATIENT)
Age: 47
End: 2022-09-27

## 2022-10-12 ENCOUNTER — APPOINTMENT (OUTPATIENT)
Dept: INTERNAL MEDICINE | Facility: CLINIC | Age: 47
End: 2022-10-12

## 2022-10-12 VITALS
TEMPERATURE: 98.4 F | DIASTOLIC BLOOD PRESSURE: 82 MMHG | OXYGEN SATURATION: 97 % | RESPIRATION RATE: 17 BRPM | HEART RATE: 107 BPM | WEIGHT: 250 LBS | SYSTOLIC BLOOD PRESSURE: 154 MMHG | BODY MASS INDEX: 34.87 KG/M2

## 2022-10-12 DIAGNOSIS — T78.40XA ALLERGY, UNSPECIFIED, INITIAL ENCOUNTER: ICD-10-CM

## 2022-10-12 PROCEDURE — 99213 OFFICE O/P EST LOW 20 MIN: CPT

## 2022-10-13 PROBLEM — T78.40XA ALLERGIC RASH PRESENT ON EXAMINATION: Status: ACTIVE | Noted: 2022-10-13

## 2022-10-21 NOTE — HISTORY OF PRESENT ILLNESS
[FreeTextEntry1] : Red rash on abdomen [de-identified] : Patient reports a red rash on abdomen nontender non-itchy.  He is very sensitive skin and therefore any allergic reaction he develops a red rash.  Abdomen was examined there is no tenderness

## 2022-10-21 NOTE — PLAN
[FreeTextEntry1] : patient rash on abdomen, does not appear to be infectious, should not interfere in  surgery of abdomen. Patient applied lotion, rash is clearing up. It appears to be more allergic in nature.

## 2022-11-05 ENCOUNTER — NON-APPOINTMENT (OUTPATIENT)
Age: 47
End: 2022-11-05

## 2023-03-13 ENCOUNTER — APPOINTMENT (OUTPATIENT)
Dept: PULMONOLOGY | Facility: CLINIC | Age: 48
End: 2023-03-13

## 2023-04-19 ENCOUNTER — APPOINTMENT (OUTPATIENT)
Dept: PULMONOLOGY | Facility: CLINIC | Age: 48
End: 2023-04-19
Payer: MEDICAID

## 2023-04-19 VITALS
WEIGHT: 248 LBS | TEMPERATURE: 96.5 F | OXYGEN SATURATION: 98 % | HEART RATE: 104 BPM | BODY MASS INDEX: 34.72 KG/M2 | DIASTOLIC BLOOD PRESSURE: 100 MMHG | HEIGHT: 71 IN | SYSTOLIC BLOOD PRESSURE: 160 MMHG

## 2023-04-19 DIAGNOSIS — G47.33 OBSTRUCTIVE SLEEP APNEA (ADULT) (PEDIATRIC): ICD-10-CM

## 2023-04-19 PROCEDURE — 99214 OFFICE O/P EST MOD 30 MIN: CPT

## 2023-04-19 NOTE — ASSESSMENT
[FreeTextEntry1] :   Patient with a history of obstructive sleep apnea compliant with CPAP therapy.  We will try to obtain a new machine as his current machine is 5 years old.  He is on AutoPap 10–20.  Patient is to return in 6 months.

## 2023-04-19 NOTE — HISTORY OF PRESENT ILLNESS
[FreeTextEntry1] : Follow-up obstructive sleep apnea syndrome. [de-identified] :   48-year-old male with ANDRES for the past 5 years.  He is a former smoker of 5 to 10 cigarettes/day but quit in 2014.  He is doing quite well with CPAP.  He states it is a life changer.  He sleeps from 11 PM to 6 AM and wakes up for short period of time once per night.  He uses CPAP all night every night.  He feels quite well during the day.  He is 5 feet 11 weight 248.  His weight is stable.  He has a history of ADHD.  He would like a new CPAP machine as his machine is currently 5 years old.  He is on AutoPap between 10 and 20 cm.  We cannot get a compliance report as there is no data available on the chip.

## 2023-04-19 NOTE — PHYSICAL EXAM
[No Acute Distress] : no acute distress [Well Developed] : well developed [Normal Sclera/Conjunctiva] : normal sclera/conjunctiva [Well-Appearing] : well-appearing [PERRL] : pupils equal round and reactive to light [EOMI] : extraocular movements intact [Normal Outer Ear/Nose] : the outer ears and nose were normal in appearance [Normal Oropharynx] : the oropharynx was normal [No JVD] : no jugular venous distention [No Lymphadenopathy] : no lymphadenopathy [Supple] : supple [No Respiratory Distress] : no respiratory distress  [Thyroid Normal, No Nodules] : the thyroid was normal and there were no nodules present [No Accessory Muscle Use] : no accessory muscle use [Clear to Auscultation] : lungs were clear to auscultation bilaterally [Normal Rate] : normal rate  [Regular Rhythm] : with a regular rhythm [Normal S1, S2] : normal S1 and S2 [No Murmur] : no murmur heard [No Carotid Bruits] : no carotid bruits [No Abdominal Bruit] : a ~M bruit was not heard ~T in the abdomen [No Varicosities] : no varicosities [Pedal Pulses Present] : the pedal pulses are present [No Edema] : there was no peripheral edema [No Palpable Aorta] : no palpable aorta [No Extremity Clubbing/Cyanosis] : no extremity clubbing/cyanosis [Soft] : abdomen soft [Non Tender] : non-tender [Non-distended] : non-distended [No Masses] : no abdominal mass palpated [No HSM] : no HSM [Normal Bowel Sounds] : normal bowel sounds [Normal Posterior Cervical Nodes] : no posterior cervical lymphadenopathy [Normal Anterior Cervical Nodes] : no anterior cervical lymphadenopathy [No Focal Deficits] : no focal deficits [Normal Gait] : normal gait [Normal Affect] : the affect was normal [Normal Insight/Judgement] : insight and judgment were intact [de-identified] : obese

## 2023-08-15 ENCOUNTER — APPOINTMENT (OUTPATIENT)
Dept: SURGERY | Facility: CLINIC | Age: 48
End: 2023-08-15
Payer: MEDICAID

## 2023-08-15 VITALS
HEART RATE: 102 BPM | DIASTOLIC BLOOD PRESSURE: 95 MMHG | TEMPERATURE: 99 F | OXYGEN SATURATION: 96 % | SYSTOLIC BLOOD PRESSURE: 161 MMHG | WEIGHT: 270.8 LBS | BODY MASS INDEX: 37.91 KG/M2 | HEIGHT: 71 IN

## 2023-08-15 PROCEDURE — 99213 OFFICE O/P EST LOW 20 MIN: CPT

## 2023-08-15 NOTE — ASSESSMENT
[FreeTextEntry1] : Patient did not undergo scheduling of surgery on his last consultation given the findings of the diffuse abdominal wall rash.  I asked this gentleman to follow-up with his dermatologist which she did and has been treated with creams.  Ultimately the patient states the creams did not make much of a difference of the rash and the dermatologist thinks this is just discoloration of his skin.  Patient is here today to schedule surgery.  He has no new complaints no significant change in his exam according to the patient.  Physical examination patient examined erect and supine position.  He clearly has a chronically incarcerated ventral hernia.  The skin overlying the hernia is thin.  There is skin around the hernia on the abdominal wall has areas of splotchiness and erythema that blanches it is almost plaque-like in nature it is not warm it is not painful visually is consistent with a rash however the patient made it very clear as that is not in the dermatologist thinks it is just skin discoloration even though the remainder of his skin does not look like this.  Impression/plan chronically incarcerated ventral hernia, I suspect abdominal wall rash versus skin discoloration as described by the dermatologist.  Given the findings on his skin the patient myself had a very long conversation regarding further surgical management with and without mesh.  Clearly the patient wants his hernia repaired because he is symptomatic from this however I am hesitant to repair this with permanent mesh given the fact that I think the mesh could get infected since he has a rash where is the dermatologist thinks this is just skin discoloration.  I think a nice compromise for this gentleman would be to perform the hernia repair surgery with a piece of resorbable mesh.  I will plan to use phasic's during this operation.  He understands since the mesh was visible there is a higher chance of recurrence but I think the trade off is much lower chance of having infection that is permanent requiring mesh removal.  The indications alternatives and complication of procedure discussed questions answered written consent obtained in the office today.

## 2023-09-06 ENCOUNTER — NON-APPOINTMENT (OUTPATIENT)
Age: 48
End: 2023-09-06

## 2023-09-06 ENCOUNTER — APPOINTMENT (OUTPATIENT)
Dept: INTERNAL MEDICINE | Facility: CLINIC | Age: 48
End: 2023-09-06
Payer: MEDICAID

## 2023-09-06 VITALS
OXYGEN SATURATION: 97 % | HEART RATE: 92 BPM | BODY MASS INDEX: 37.24 KG/M2 | DIASTOLIC BLOOD PRESSURE: 78 MMHG | SYSTOLIC BLOOD PRESSURE: 140 MMHG | HEIGHT: 71 IN | WEIGHT: 266 LBS | RESPIRATION RATE: 16 BRPM

## 2023-09-06 DIAGNOSIS — Z01.818 ENCOUNTER FOR OTHER PREPROCEDURAL EXAMINATION: ICD-10-CM

## 2023-09-06 PROCEDURE — 99215 OFFICE O/P EST HI 40 MIN: CPT | Mod: 25

## 2023-09-06 PROCEDURE — 93000 ELECTROCARDIOGRAM COMPLETE: CPT

## 2023-09-07 ENCOUNTER — TRANSCRIPTION ENCOUNTER (OUTPATIENT)
Age: 48
End: 2023-09-07

## 2023-09-07 PROBLEM — Z01.818 PREOP EXAM FOR INTERNAL MEDICINE: Status: ACTIVE | Noted: 2023-09-06

## 2023-09-07 LAB
ALBUMIN SERPL ELPH-MCNC: 4.8 G/DL
ALP BLD-CCNC: 86 U/L
ALT SERPL-CCNC: 46 U/L
ANION GAP SERPL CALC-SCNC: 12 MMOL/L
AST SERPL-CCNC: 29 U/L
BILIRUB SERPL-MCNC: 0.3 MG/DL
BUN SERPL-MCNC: 18 MG/DL
CALCIUM SERPL-MCNC: 9.4 MG/DL
CHLORIDE SERPL-SCNC: 101 MMOL/L
CO2 SERPL-SCNC: 23 MMOL/L
CREAT SERPL-MCNC: 0.59 MG/DL
EGFR: 118 ML/MIN/1.73M2
ESTIMATED AVERAGE GLUCOSE: 137 MG/DL
GLUCOSE SERPL-MCNC: 127 MG/DL
HBA1C MFR BLD HPLC: 6.4 %
HCT VFR BLD CALC: 45.9 %
HGB BLD-MCNC: 15.1 G/DL
MCHC RBC-ENTMCNC: 32.9 GM/DL
MCHC RBC-ENTMCNC: 33.6 PG
MCV RBC AUTO: 102.2 FL
PLATELET # BLD AUTO: 185 K/UL
POTASSIUM SERPL-SCNC: 4.9 MMOL/L
PROT SERPL-MCNC: 6.9 G/DL
RBC # BLD: 4.49 M/UL
RBC # FLD: 12.6 %
SODIUM SERPL-SCNC: 135 MMOL/L
WBC # FLD AUTO: 6.01 K/UL

## 2023-09-07 NOTE — REVIEW OF SYSTEMS
[Fever] : no fever [Chest Pain] : no chest pain [Palpitations] : no palpitations [Shortness Of Breath] : no shortness of breath [Wheezing] : no wheezing [Cough] : no cough [Abdominal Pain] : no abdominal pain [Nausea] : no nausea [Constipation] : no constipation [Diarrhea] : no diarrhea [Vomiting] : no vomiting [Heartburn] : no heartburn [Dysuria] : no dysuria [Hematuria] : no hematuria [Frequency] : no frequency

## 2023-09-07 NOTE — RESULTS/DATA
[] : results reviewed [de-identified] : Normal [de-identified] : Minimal LFT elevation and significant [de-identified] : Normal sinus rhythm and unchanged from previous

## 2023-09-07 NOTE — HISTORY OF PRESENT ILLNESS
[COPD] : COPD [Diabetes] : diabetes [(Patient denies any chest pain, claudication, dyspnea on exertion, orthopnea, palpitations or syncope)] : Patient denies any chest pain, claudication, dyspnea on exertion, orthopnea, palpitations or syncope [Aortic Stenosis] : no aortic stenosis [Atrial Fibrillation] : no atrial fibrillation [Coronary Artery Disease] : no coronary artery disease [Recent Myocardial Infarction] : no recent myocardial infarction [Implantable Device/Pacemaker] : no implantable device/pacemaker [Asthma] : no asthma [Sleep Apnea] : no sleep apnea [Smoker] : not a smoker [Family Member] : no family member with adverse anesthesia reaction/sudden death [Self] : no previous adverse anesthesia reaction [Chronic Anticoagulation] : no chronic anticoagulation [Chronic Kidney Disease] : no chronic kidney disease [FreeTextEntry1] : Bilateral component separation repair with mesh [FreeTextEntry2] : September 18, 2023 [FreeTextEntry3] : GI surgery [FreeTextEntry4] : Patient here for preop clearance bilateral component repair.  He feels well.  Says no hx of complications/excessive bleeding with surgery or anesthesia in the past. No family hx bleeding problems with surgery.

## 2023-09-08 ENCOUNTER — TRANSCRIPTION ENCOUNTER (OUTPATIENT)
Age: 48
End: 2023-09-08

## 2023-09-18 ENCOUNTER — RESULT REVIEW (OUTPATIENT)
Age: 48
End: 2023-09-18

## 2023-09-18 ENCOUNTER — APPOINTMENT (OUTPATIENT)
Dept: SURGERY | Facility: HOSPITAL | Age: 48
End: 2023-09-18

## 2023-09-18 ENCOUNTER — TRANSCRIPTION ENCOUNTER (OUTPATIENT)
Age: 48
End: 2023-09-18

## 2023-09-26 ENCOUNTER — APPOINTMENT (OUTPATIENT)
Dept: SURGERY | Facility: CLINIC | Age: 48
End: 2023-09-26
Payer: MEDICAID

## 2023-09-26 VITALS
OXYGEN SATURATION: 96 % | HEART RATE: 83 BPM | TEMPERATURE: 99.1 F | DIASTOLIC BLOOD PRESSURE: 90 MMHG | SYSTOLIC BLOOD PRESSURE: 151 MMHG

## 2023-09-26 PROCEDURE — 99024 POSTOP FOLLOW-UP VISIT: CPT

## 2023-10-03 ENCOUNTER — APPOINTMENT (OUTPATIENT)
Dept: SURGERY | Facility: CLINIC | Age: 48
End: 2023-10-03
Payer: MEDICAID

## 2023-10-03 VITALS
SYSTOLIC BLOOD PRESSURE: 156 MMHG | TEMPERATURE: 98.6 F | DIASTOLIC BLOOD PRESSURE: 99 MMHG | HEART RATE: 88 BPM | OXYGEN SATURATION: 98 %

## 2023-10-03 DIAGNOSIS — K43.6 OTHER AND UNSPECIFIED VENTRAL HERNIA WITH OBSTRUCTION, W/OUT GANGRENE: ICD-10-CM

## 2023-10-03 PROCEDURE — 99024 POSTOP FOLLOW-UP VISIT: CPT

## 2023-10-18 ENCOUNTER — APPOINTMENT (OUTPATIENT)
Dept: PULMONOLOGY | Facility: CLINIC | Age: 48
End: 2023-10-18

## 2023-11-16 LAB — HBA1C MFR BLD HPLC: 6.4

## 2023-12-05 ENCOUNTER — APPOINTMENT (OUTPATIENT)
Dept: INTERNAL MEDICINE | Facility: CLINIC | Age: 48
End: 2023-12-05
Payer: MEDICAID

## 2023-12-05 VITALS
TEMPERATURE: 98 F | HEART RATE: 86 BPM | BODY MASS INDEX: 36.82 KG/M2 | DIASTOLIC BLOOD PRESSURE: 86 MMHG | SYSTOLIC BLOOD PRESSURE: 138 MMHG | RESPIRATION RATE: 16 BRPM | OXYGEN SATURATION: 98 % | WEIGHT: 263 LBS | HEIGHT: 71 IN

## 2023-12-05 DIAGNOSIS — Z00.00 ENCOUNTER FOR GENERAL ADULT MEDICAL EXAMINATION W/OUT ABNORMAL FINDINGS: ICD-10-CM

## 2023-12-05 PROCEDURE — G0447 BEHAVIOR COUNSEL OBESITY 15M: CPT | Mod: 59

## 2023-12-05 PROCEDURE — 99396 PREV VISIT EST AGE 40-64: CPT | Mod: 25

## 2023-12-05 PROCEDURE — G0443: CPT | Mod: 59

## 2023-12-07 LAB
25(OH)D3 SERPL-MCNC: 20.1 NG/ML
ALBUMIN SERPL ELPH-MCNC: 4.6 G/DL
ALP BLD-CCNC: 92 U/L
ALT SERPL-CCNC: 37 U/L
ANION GAP SERPL CALC-SCNC: 14 MMOL/L
APPEARANCE: CLEAR
AST SERPL-CCNC: 24 U/L
BASOPHILS # BLD AUTO: 0.07 K/UL
BASOPHILS NFR BLD AUTO: 1 %
BILIRUB SERPL-MCNC: 0.3 MG/DL
BILIRUBIN URINE: NEGATIVE
BLOOD URINE: NEGATIVE
BUN SERPL-MCNC: 17 MG/DL
CALCIUM SERPL-MCNC: 9.3 MG/DL
CHLORIDE SERPL-SCNC: 101 MMOL/L
CHOLEST SERPL-MCNC: 204 MG/DL
CO2 SERPL-SCNC: 22 MMOL/L
COLOR: YELLOW
CREAT SERPL-MCNC: 0.7 MG/DL
EGFR: 112 ML/MIN/1.73M2
EOSINOPHIL # BLD AUTO: 0.3 K/UL
EOSINOPHIL NFR BLD AUTO: 4.3 %
ESTIMATED AVERAGE GLUCOSE: 128 MG/DL
GLUCOSE QUALITATIVE U: NEGATIVE MG/DL
GLUCOSE SERPL-MCNC: 151 MG/DL
HBA1C MFR BLD HPLC: 6.1 %
HCT VFR BLD CALC: 44.9 %
HDLC SERPL-MCNC: 48 MG/DL
HGB BLD-MCNC: 14.9 G/DL
IMM GRANULOCYTES NFR BLD AUTO: 0.3 %
KETONES URINE: NEGATIVE MG/DL
LDLC SERPL CALC-MCNC: 136 MG/DL
LEUKOCYTE ESTERASE URINE: NEGATIVE
LYMPHOCYTES # BLD AUTO: 1.56 K/UL
LYMPHOCYTES NFR BLD AUTO: 22.2 %
MAN DIFF?: NORMAL
MCHC RBC-ENTMCNC: 33.2 GM/DL
MCHC RBC-ENTMCNC: 33.3 PG
MCV RBC AUTO: 100.2 FL
MONOCYTES # BLD AUTO: 0.58 K/UL
MONOCYTES NFR BLD AUTO: 8.3 %
NEUTROPHILS # BLD AUTO: 4.49 K/UL
NEUTROPHILS NFR BLD AUTO: 63.9 %
NITRITE URINE: NEGATIVE
NONHDLC SERPL-MCNC: 156 MG/DL
PH URINE: 7
PLATELET # BLD AUTO: 201 K/UL
POTASSIUM SERPL-SCNC: 5 MMOL/L
PROT SERPL-MCNC: 7.1 G/DL
PROTEIN URINE: NEGATIVE MG/DL
RBC # BLD: 4.48 M/UL
RBC # FLD: 12.7 %
SODIUM SERPL-SCNC: 136 MMOL/L
SPECIFIC GRAVITY URINE: 1.02
T4 SERPL-MCNC: 6.2 UG/DL
TRIGL SERPL-MCNC: 113 MG/DL
TSH SERPL-ACNC: 0.9 UIU/ML
UROBILINOGEN URINE: 0.2 MG/DL
VIT B12 SERPL-MCNC: 444 PG/ML
WBC # FLD AUTO: 7.02 K/UL

## 2023-12-08 LAB — PSA SERPL-MCNC: 0.29 NG/ML

## 2024-03-18 ENCOUNTER — NON-APPOINTMENT (OUTPATIENT)
Age: 49
End: 2024-03-18

## 2024-04-10 ENCOUNTER — APPOINTMENT (OUTPATIENT)
Dept: ORTHOPEDIC SURGERY | Facility: CLINIC | Age: 49
End: 2024-04-10
Payer: COMMERCIAL

## 2024-04-10 ENCOUNTER — RESULT REVIEW (OUTPATIENT)
Age: 49
End: 2024-04-10

## 2024-04-10 VITALS — HEIGHT: 71 IN | WEIGHT: 256 LBS | BODY MASS INDEX: 35.84 KG/M2

## 2024-04-10 PROCEDURE — 20610 DRAIN/INJ JOINT/BURSA W/O US: CPT | Mod: RT

## 2024-04-10 PROCEDURE — 99203 OFFICE O/P NEW LOW 30 MIN: CPT | Mod: 25

## 2024-04-10 NOTE — PHYSICAL EXAM
[de-identified] : +effusion rom 0-120 mild pain +ttp medial  nvid no pain w hip rom [de-identified] : mild oa

## 2024-04-10 NOTE — DISCUSSION/SUMMARY
[de-identified] : right knee oa mild and probable meniscus tear drained and shot today pt script fu as needed

## 2024-04-10 NOTE — HISTORY OF PRESENT ILLNESS
[de-identified] : right medial knee pain 2-3 weeks no trauma no radiation tried nsaids not much better comes and goes

## 2024-04-10 NOTE — PROCEDURE
[de-identified] : patients wants a cortisone injection.  risks, benefits, and alternatives discussed and patient gave consent.  under sterile conditions using the superior lateral portal i injected 80mg of depomedrol 4cc of dexa and 2cc of 0.25% marcaine after aspirating 30cc of clear fluid.  patient tolerated procedure well and will rest and ice and fu when injection wears off. right knee

## 2024-05-22 ENCOUNTER — APPOINTMENT (OUTPATIENT)
Dept: FAMILY MEDICINE | Facility: CLINIC | Age: 49
End: 2024-05-22

## 2024-05-24 ENCOUNTER — APPOINTMENT (OUTPATIENT)
Dept: ORTHOPEDIC SURGERY | Facility: CLINIC | Age: 49
End: 2024-05-24
Payer: COMMERCIAL

## 2024-05-24 VITALS — BODY MASS INDEX: 35.84 KG/M2 | WEIGHT: 256 LBS | HEIGHT: 71 IN

## 2024-05-24 PROCEDURE — 20610 DRAIN/INJ JOINT/BURSA W/O US: CPT | Mod: RT

## 2024-05-24 PROCEDURE — 99214 OFFICE O/P EST MOD 30 MIN: CPT | Mod: 25

## 2024-05-24 NOTE — PHYSICAL EXAM
[de-identified] : Knee ranges 5-115 degrees with pain and crepitus stable to varus, valgus, anterior and posterior stress neurovascularly intact distally no pain with hip range of motion negative straight leg raise +effusion +eliz +joint line tenderness skin intact [de-identified] : well preserved joint spaces on weight bearing xrays both knees

## 2024-05-24 NOTE — HISTORY OF PRESENT ILLNESS
[de-identified] : pain was getting better now worse and swollen again clicking and catching has tried 6 weeks of therapeutic exercises under my control tried nsaids for 6 weeks and tylenol

## 2024-05-28 DIAGNOSIS — S83.206A UNSPECIFIED TEAR OF UNSPECIFIED MENISCUS, CURRENT INJURY, RIGHT KNEE, INITIAL ENCOUNTER: ICD-10-CM

## 2024-07-31 ENCOUNTER — RESULT REVIEW (OUTPATIENT)
Age: 49
End: 2024-07-31

## 2024-08-08 ENCOUNTER — TRANSCRIPTION ENCOUNTER (OUTPATIENT)
Age: 49
End: 2024-08-08

## 2024-08-08 ENCOUNTER — APPOINTMENT (OUTPATIENT)
Dept: ORTHOPEDIC SURGERY | Facility: HOSPITAL | Age: 49
End: 2024-08-08

## 2024-08-08 PROCEDURE — 29881 ARTHRS KNE SRG MNISECTMY M/L: CPT | Mod: RT

## 2024-08-16 ENCOUNTER — APPOINTMENT (OUTPATIENT)
Dept: ORTHOPEDIC SURGERY | Facility: CLINIC | Age: 49
End: 2024-08-16
Payer: COMMERCIAL

## 2024-08-16 VITALS — BODY MASS INDEX: 35.84 KG/M2 | WEIGHT: 256 LBS | HEIGHT: 71 IN

## 2024-08-16 DIAGNOSIS — Z98.890 OTHER SPECIFIED POSTPROCEDURAL STATES: ICD-10-CM

## 2024-08-16 PROCEDURE — 99024 POSTOP FOLLOW-UP VISIT: CPT

## 2024-08-16 NOTE — PHYSICAL EXAM
[de-identified] : nad nl gait no assistive device incisoins c/d/i sutures removed, steris placed nvid rom 0-120 no c/c/e

## 2024-12-27 ENCOUNTER — APPOINTMENT (OUTPATIENT)
Dept: FAMILY MEDICINE | Facility: CLINIC | Age: 49
End: 2024-12-27

## 2025-01-16 ENCOUNTER — NON-APPOINTMENT (OUTPATIENT)
Age: 50
End: 2025-01-16

## 2025-01-16 ENCOUNTER — APPOINTMENT (OUTPATIENT)
Dept: FAMILY MEDICINE | Facility: CLINIC | Age: 50
End: 2025-01-16
Payer: COMMERCIAL

## 2025-01-16 VITALS
RESPIRATION RATE: 16 BRPM | HEART RATE: 86 BPM | BODY MASS INDEX: 38.78 KG/M2 | SYSTOLIC BLOOD PRESSURE: 132 MMHG | WEIGHT: 277 LBS | HEIGHT: 71 IN | TEMPERATURE: 97.1 F | DIASTOLIC BLOOD PRESSURE: 84 MMHG | OXYGEN SATURATION: 98 %

## 2025-01-16 VITALS — DIASTOLIC BLOOD PRESSURE: 84 MMHG | SYSTOLIC BLOOD PRESSURE: 139 MMHG

## 2025-01-16 DIAGNOSIS — F90.9 ATTENTION-DEFICIT HYPERACTIVITY DISORDER, UNSPECIFIED TYPE: ICD-10-CM

## 2025-01-16 DIAGNOSIS — I10 ESSENTIAL (PRIMARY) HYPERTENSION: ICD-10-CM

## 2025-01-16 DIAGNOSIS — Z87.09 PERSONAL HISTORY OF OTHER DISEASES OF THE RESPIRATORY SYSTEM: ICD-10-CM

## 2025-01-16 DIAGNOSIS — K43.6 OTHER AND UNSPECIFIED VENTRAL HERNIA WITH OBSTRUCTION, W/OUT GANGRENE: ICD-10-CM

## 2025-01-16 DIAGNOSIS — S82.842D DISPLACED BIMALLEOLAR FRACTURE OF LEFT LOWER LEG, SUBSEQUENT ENCOUNTER FOR CLOSED FRACTURE WITH ROUTINE HEALING: ICD-10-CM

## 2025-01-16 DIAGNOSIS — F41.9 ANXIETY DISORDER, UNSPECIFIED: ICD-10-CM

## 2025-01-16 DIAGNOSIS — E78.00 PURE HYPERCHOLESTEROLEMIA, UNSPECIFIED: ICD-10-CM

## 2025-01-16 DIAGNOSIS — U07.1 COVID-19: ICD-10-CM

## 2025-01-16 DIAGNOSIS — Z76.89 PERSONS ENCOUNTERING HEALTH SERVICES IN OTHER SPECIFIED CIRCUMSTANCES: ICD-10-CM

## 2025-01-16 DIAGNOSIS — Z12.11 ENCOUNTER FOR SCREENING FOR MALIGNANT NEOPLASM OF COLON: ICD-10-CM

## 2025-01-16 DIAGNOSIS — S91.309A UNSPECIFIED OPEN WOUND, UNSPECIFIED FOOT, INITIAL ENCOUNTER: ICD-10-CM

## 2025-01-16 DIAGNOSIS — R06.5 DISTURBANCES OF SALIVARY SECRETION: ICD-10-CM

## 2025-01-16 DIAGNOSIS — N40.0 BENIGN PROSTATIC HYPERPLASIA WITHOUT LOWER URINARY TRACT SYMPMS: ICD-10-CM

## 2025-01-16 DIAGNOSIS — M25.579 PAIN IN UNSPECIFIED ANKLE AND JOINTS OF UNSPECIFIED FOOT: ICD-10-CM

## 2025-01-16 DIAGNOSIS — E66.811 OBESITY, CLASS 1: ICD-10-CM

## 2025-01-16 DIAGNOSIS — Z01.818 ENCOUNTER FOR OTHER PREPROCEDURAL EXAMINATION: ICD-10-CM

## 2025-01-16 DIAGNOSIS — Z79.4 DRUG OR CHEMICAL INDUCED DIABETES MELLITUS WITH NEUROLOGICAL COMPLICATIONS WITH DIABETIC AUTONOMIC (POLY)NEUROPATHY: ICD-10-CM

## 2025-01-16 DIAGNOSIS — Z87.828 PERSONAL HISTORY OF OTHER (HEALED) PHYSICAL INJURY AND TRAUMA: ICD-10-CM

## 2025-01-16 DIAGNOSIS — E11.49 TYPE 2 DIABETES MELLITUS WITH OTHER DIABETIC NEUROLOGICAL COMPLICATION: ICD-10-CM

## 2025-01-16 DIAGNOSIS — F51.04 PSYCHOPHYSIOLOGIC INSOMNIA: ICD-10-CM

## 2025-01-16 DIAGNOSIS — F32.A ANXIETY DISORDER, UNSPECIFIED: ICD-10-CM

## 2025-01-16 DIAGNOSIS — Z79.4 TYPE 2 DIABETES MELLITUS WITH OTHER DIABETIC NEUROLOGICAL COMPLICATION: ICD-10-CM

## 2025-01-16 DIAGNOSIS — E66.9 OBESITY, UNSPECIFIED: ICD-10-CM

## 2025-01-16 DIAGNOSIS — R19.5 OTHER FECAL ABNORMALITIES: ICD-10-CM

## 2025-01-16 DIAGNOSIS — K11.7 DISTURBANCES OF SALIVARY SECRETION: ICD-10-CM

## 2025-01-16 DIAGNOSIS — Z98.890 OTHER SPECIFIED POSTPROCEDURAL STATES: ICD-10-CM

## 2025-01-16 DIAGNOSIS — Z86.39 PERSONAL HISTORY OF OTHER ENDOCRINE, NUTRITIONAL AND METABOLIC DISEASE: ICD-10-CM

## 2025-01-16 DIAGNOSIS — R73.03 PREDIABETES.: ICD-10-CM

## 2025-01-16 DIAGNOSIS — E09.43 DRUG OR CHEMICAL INDUCED DIABETES MELLITUS WITH NEUROLOGICAL COMPLICATIONS WITH DIABETIC AUTONOMIC (POLY)NEUROPATHY: ICD-10-CM

## 2025-01-16 DIAGNOSIS — T78.40XA ALLERGY, UNSPECIFIED, INITIAL ENCOUNTER: ICD-10-CM

## 2025-01-16 DIAGNOSIS — S82.842A DISPLACED BIMALLEOLAR FRACTURE OF LEFT LOWER LEG, INITIAL ENCOUNTER FOR CLOSED FRACTURE: ICD-10-CM

## 2025-01-16 DIAGNOSIS — Z82.5 FAMILY HISTORY OF ASTHMA AND OTHER CHRONIC LOWER RESPIRATORY DISEASES: ICD-10-CM

## 2025-01-16 DIAGNOSIS — H61.22 IMPACTED CERUMEN, LEFT EAR: ICD-10-CM

## 2025-01-16 DIAGNOSIS — U09.9 POST COVID-19 CONDITION, UNSPECIFIED: ICD-10-CM

## 2025-01-16 DIAGNOSIS — Z86.79 PERSONAL HISTORY OF OTHER DISEASES OF THE CIRCULATORY SYSTEM: ICD-10-CM

## 2025-01-16 PROCEDURE — 93000 ELECTROCARDIOGRAM COMPLETE: CPT

## 2025-01-16 PROCEDURE — G2211 COMPLEX E/M VISIT ADD ON: CPT

## 2025-01-16 PROCEDURE — 99205 OFFICE O/P NEW HI 60 MIN: CPT

## 2025-01-25 ENCOUNTER — TRANSCRIPTION ENCOUNTER (OUTPATIENT)
Age: 50
End: 2025-01-25

## 2025-01-25 LAB
CHOLEST SERPL-MCNC: 197 MG/DL
ESTIMATED AVERAGE GLUCOSE: 148 MG/DL
HBA1C MFR BLD HPLC: 6.8 %
HDLC SERPL-MCNC: 40 MG/DL
LDLC SERPL CALC-MCNC: 135 MG/DL
NONHDLC SERPL-MCNC: 157 MG/DL
PSA SERPL-MCNC: 0.27 NG/ML
TRIGL SERPL-MCNC: 126 MG/DL
TSH SERPL-ACNC: 0.78 UIU/ML

## 2025-02-20 ENCOUNTER — APPOINTMENT (OUTPATIENT)
Dept: FAMILY MEDICINE | Facility: CLINIC | Age: 50
End: 2025-02-20
Payer: COMMERCIAL

## 2025-02-20 VITALS
SYSTOLIC BLOOD PRESSURE: 134 MMHG | BODY MASS INDEX: 38.22 KG/M2 | HEART RATE: 110 BPM | OXYGEN SATURATION: 95 % | RESPIRATION RATE: 16 BRPM | DIASTOLIC BLOOD PRESSURE: 80 MMHG | WEIGHT: 273 LBS | HEIGHT: 71 IN | TEMPERATURE: 97.3 F

## 2025-02-20 VITALS — SYSTOLIC BLOOD PRESSURE: 130 MMHG | DIASTOLIC BLOOD PRESSURE: 76 MMHG

## 2025-02-20 DIAGNOSIS — R73.03 PREDIABETES.: ICD-10-CM

## 2025-02-20 DIAGNOSIS — E66.9 OBESITY, UNSPECIFIED: ICD-10-CM

## 2025-02-20 DIAGNOSIS — E78.00 PURE HYPERCHOLESTEROLEMIA, UNSPECIFIED: ICD-10-CM

## 2025-02-20 DIAGNOSIS — H61.22 IMPACTED CERUMEN, LEFT EAR: ICD-10-CM

## 2025-02-20 DIAGNOSIS — Z76.89 PERSONS ENCOUNTERING HEALTH SERVICES IN OTHER SPECIFIED CIRCUMSTANCES: ICD-10-CM

## 2025-02-20 DIAGNOSIS — Z00.00 ENCOUNTER FOR GENERAL ADULT MEDICAL EXAMINATION W/OUT ABNORMAL FINDINGS: ICD-10-CM

## 2025-02-20 DIAGNOSIS — R79.89 OTHER SPECIFIED ABNORMAL FINDINGS OF BLOOD CHEMISTRY: ICD-10-CM

## 2025-02-20 PROCEDURE — 99396 PREV VISIT EST AGE 40-64: CPT

## 2025-04-24 ENCOUNTER — APPOINTMENT (OUTPATIENT)
Dept: FAMILY MEDICINE | Facility: CLINIC | Age: 50
End: 2025-04-24